# Patient Record
Sex: FEMALE | Race: WHITE | Employment: FULL TIME | URBAN - METROPOLITAN AREA
[De-identification: names, ages, dates, MRNs, and addresses within clinical notes are randomized per-mention and may not be internally consistent; named-entity substitution may affect disease eponyms.]

---

## 2023-08-22 ENCOUNTER — TELEPHONE (OUTPATIENT)
Dept: PRIMARY CARE | Facility: CLINIC | Age: 58
End: 2023-08-22
Payer: COMMERCIAL

## 2023-08-22 DIAGNOSIS — G62.9 NEUROPATHY: Primary | ICD-10-CM

## 2023-08-22 DIAGNOSIS — N39.0 URINARY TRACT INFECTION WITHOUT HEMATURIA, SITE UNSPECIFIED: ICD-10-CM

## 2023-08-22 RX ORDER — GABAPENTIN 100 MG/1
100 CAPSULE ORAL NIGHTLY
Qty: 90 CAPSULE | Refills: 1 | Status: SHIPPED | OUTPATIENT
Start: 2023-08-22 | End: 2024-04-23 | Stop reason: SDUPTHER

## 2023-08-22 RX ORDER — GABAPENTIN 100 MG/1
1 CAPSULE ORAL NIGHTLY
COMMUNITY
Start: 2020-07-17 | End: 2023-08-22 | Stop reason: SDUPTHER

## 2023-08-22 NOTE — TELEPHONE ENCOUNTER
Pt. Asking for antibiotic for uti. States having pressure and urgency. Would like [facility] to [facility].

## 2023-08-23 RX ORDER — CIPROFLOXACIN 500 MG/1
500 TABLET ORAL 2 TIMES DAILY
Qty: 10 TABLET | Refills: 0 | Status: SHIPPED | OUTPATIENT
Start: 2023-08-23 | End: 2023-08-26 | Stop reason: SINTOL

## 2023-08-26 DIAGNOSIS — R39.9 URINARY TRACT INFECTION SYMPTOMS: Primary | ICD-10-CM

## 2023-08-26 DIAGNOSIS — R11.0 NAUSEA: ICD-10-CM

## 2023-08-26 RX ORDER — ONDANSETRON 4 MG/1
4 TABLET, ORALLY DISINTEGRATING ORAL EVERY 8 HOURS PRN
Qty: 15 TABLET | Refills: 0 | Status: SHIPPED | OUTPATIENT
Start: 2023-08-26 | End: 2023-08-31

## 2023-08-26 RX ORDER — NITROFURANTOIN 25; 75 MG/1; MG/1
100 CAPSULE ORAL 2 TIMES DAILY
Qty: 10 CAPSULE | Refills: 0 | Status: SHIPPED | OUTPATIENT
Start: 2023-08-26 | End: 2023-08-31

## 2023-12-21 ENCOUNTER — OFFICE VISIT (OUTPATIENT)
Dept: ORTHOPEDIC SURGERY | Facility: CLINIC | Age: 58
End: 2023-12-21
Payer: COMMERCIAL

## 2023-12-21 DIAGNOSIS — G56.01 CARPAL TUNNEL SYNDROME OF RIGHT WRIST: Primary | ICD-10-CM

## 2023-12-21 PROCEDURE — 1036F TOBACCO NON-USER: CPT | Performed by: ORTHOPAEDIC SURGERY

## 2023-12-21 PROCEDURE — 99213 OFFICE O/P EST LOW 20 MIN: CPT | Performed by: ORTHOPAEDIC SURGERY

## 2023-12-21 NOTE — PROGRESS NOTES
CHIEF COMPLAINT         Right hand inj    ASSESSMENT + PLAN    Recurrent right carpal tunnel syndrome after previous cortisone injection    The nature of carpal tunnel syndrome was reviewed, along with the natural history.  I reviewed the options for management, including observation, nonsteroidals, splinting, oral steroids, cortisone injection, or surgical release, along with the likely success rates of each.  I reviewed the risks of cortisone injection, including infection, hypopigmentation, and fat atrophy.  The transient effect on blood glucose measurement was also reviewed, and the patient wished to proceed.    After sterile prep, I injected 30 mg of Kenalog and 1 cc of 1% lidocaine, plain to the right carpal tunnel.    Patient will followup in 4 weeks if still symptomatic, or with any concerns, and will continue with the brace and nonsteroidals as needed.        HISTORY OF PRESENT ILLNESS       Patient returns today, 3 months after last visit with recurrent numbness and tingling in the median distribution of her right hand.  She is here requesting another cortisone injection.  She does not feel she has time for surgery ahead of the busy holiday season.  No similar symptoms on the left.  This interval is similar to her previous interval between injections.  She is using her night splint.  No interval trauma.      PHYSICAL EXAM       She remains well-developed, well-nourished female in no acute distress.  She appears her stated age and has a pleasant affect.  Skin of the right hand and wrist is intact with no erythema, ecchymosis, or diffuse swelling.  There is a little pink discoloration from the cortisone injection but no hypopigmentation.  Full composite finger flexion extension with good intrinsic plus minus posture.  Intact thenar motor function with 5/5 strength and no wasting.  Sensation intact to light touch in all distributions.  Capillary refill less than 2 seconds.  Positive Phalen and Durkan at the  wrist but negative Tinel at wrist and elbow.  Cervical range of motion does not cause discomfort in the hand.  2+ radial and ulnar pulses.      IMAGING / LABS / EMGs    None today      PROCEDURE    Hand / UE Inj/Asp: R carpal tunnel for carpal tunnel syndrome on 12/22/2023 7:49 AM  Indications: therapeutic and diagnostic  Details: 25 G needle, volar approach  Medications: 30 mg triamcinolone acetonide 40 mg/mL; 0.75 mL lidocaine 10 mg/mL (1 %)  Procedure, treatment alternatives, risks and benefits explained, specific risks discussed. Consent was given by the patient.             Electronically Signed      CINTIA Porter MD      Orthopaedic Hand Surgery      [phone]

## 2023-12-21 NOTE — LETTER
December 22, 2023     Henry Briggs MD  [address]  [address]  [facility] [address]    Patient: Ardiana Trinidad   YOB: 1965   Date of Visit: 12/21/2023       Dear Dr. Henry Briggs MD:    Thank you for referring Adriana Trinidad to me for evaluation. Below are my notes for this consultation.  If you have questions, please do not hesitate to call me. I look forward to following your patient along with you.       Sincerely,     Paulie Porter MD      CC: No Recipients  ______________________________________________________________________________________    CHIEF COMPLAINT         Right hand inj    ASSESSMENT + PLAN    Recurrent right carpal tunnel syndrome after previous cortisone injection    The nature of carpal tunnel syndrome was reviewed, along with the natural history.  I reviewed the options for management, including observation, nonsteroidals, splinting, oral steroids, cortisone injection, or surgical release, along with the likely success rates of each.  I reviewed the risks of cortisone injection, including infection, hypopigmentation, and fat atrophy.  The transient effect on blood glucose measurement was also reviewed, and the patient wished to proceed.    After sterile prep, I injected 30 mg of Kenalog and 1 cc of 1% lidocaine, plain to the right carpal tunnel.    Patient will followup in 4 weeks if still symptomatic, or with any concerns, and will continue with the brace and nonsteroidals as needed.        HISTORY OF PRESENT ILLNESS       Patient returns today, 3 months after last visit with recurrent numbness and tingling in the median distribution of her right hand.  She is here requesting another cortisone injection.  She does not feel she has time for surgery ahead of the busy holiday season.  No similar symptoms on the left.  This interval is similar to her previous interval between injections.  She is using her night splint.  No interval trauma.      PHYSICAL  EXAM       She remains well-developed, well-nourished female in no acute distress.  She appears her stated age and has a pleasant affect.  Skin of the right hand and wrist is intact with no erythema, ecchymosis, or diffuse swelling.  There is a little pink discoloration from the cortisone injection but no hypopigmentation.  Full composite finger flexion extension with good intrinsic plus minus posture.  Intact thenar motor function with 5/5 strength and no wasting.  Sensation intact to light touch in all distributions.  Capillary refill less than 2 seconds.  Positive Phalen and Durkan at the wrist but negative Tinel at wrist and elbow.  Cervical range of motion does not cause discomfort in the hand.  2+ radial and ulnar pulses.      IMAGING / LABS / EMGs    None today      PROCEDURE    Hand / UE Inj/Asp: R carpal tunnel for carpal tunnel syndrome on 12/22/2023 7:49 AM  Indications: therapeutic and diagnostic  Details: 25 G needle, volar approach  Medications: 30 mg triamcinolone acetonide 40 mg/mL; 0.75 mL lidocaine 10 mg/mL (1 %)  Procedure, treatment alternatives, risks and benefits explained, specific risks discussed. Consent was given by the patient.             Electronically Signed      CINTIA Porter MD      Orthopaedic Hand Surgery      [phone]

## 2023-12-22 PROCEDURE — 20526 THER INJECTION CARP TUNNEL: CPT | Performed by: ORTHOPAEDIC SURGERY

## 2023-12-22 RX ORDER — TRIAMCINOLONE ACETONIDE 40 MG/ML
30 INJECTION, SUSPENSION INTRA-ARTICULAR; INTRAMUSCULAR
Status: COMPLETED | OUTPATIENT
Start: 2023-12-22 | End: 2023-12-22

## 2023-12-22 RX ORDER — LIDOCAINE HYDROCHLORIDE 10 MG/ML
0.75 INJECTION INFILTRATION; PERINEURAL
Status: COMPLETED | OUTPATIENT
Start: 2023-12-22 | End: 2023-12-22

## 2023-12-22 RX ADMIN — LIDOCAINE HYDROCHLORIDE 0.75 ML: 10 INJECTION INFILTRATION; PERINEURAL at 07:49

## 2023-12-22 RX ADMIN — TRIAMCINOLONE ACETONIDE 30 MG: 40 INJECTION, SUSPENSION INTRA-ARTICULAR; INTRAMUSCULAR at 07:49

## 2024-02-22 ENCOUNTER — TELEPHONE (OUTPATIENT)
Dept: PRIMARY CARE | Facility: CLINIC | Age: 59
End: 2024-02-22
Payer: COMMERCIAL

## 2024-02-22 NOTE — TELEPHONE ENCOUNTER
Pt. Thinks has uti. States has burning and pressure. [facility] Can you please advise?

## 2024-02-22 NOTE — TELEPHONE ENCOUNTER
Spoke with patient and advised of message   Pt. Will wait until Dr. Briggs's return, does not have time for appt.

## 2024-04-04 ENCOUNTER — OFFICE VISIT (OUTPATIENT)
Dept: ORTHOPEDIC SURGERY | Facility: CLINIC | Age: 59
End: 2024-04-04
Payer: COMMERCIAL

## 2024-04-04 DIAGNOSIS — G56.01 CARPAL TUNNEL SYNDROME OF RIGHT WRIST: Primary | ICD-10-CM

## 2024-04-04 PROCEDURE — 99213 OFFICE O/P EST LOW 20 MIN: CPT | Performed by: ORTHOPAEDIC SURGERY

## 2024-04-04 PROCEDURE — 1036F TOBACCO NON-USER: CPT | Performed by: ORTHOPAEDIC SURGERY

## 2024-04-04 PROCEDURE — 20526 THER INJECTION CARP TUNNEL: CPT | Performed by: ORTHOPAEDIC SURGERY

## 2024-04-04 RX ADMIN — LIDOCAINE HYDROCHLORIDE 0.75 ML: 10 INJECTION INFILTRATION; PERINEURAL at 07:28

## 2024-04-04 RX ADMIN — TRIAMCINOLONE ACETONIDE 30 MG: 40 INJECTION, SUSPENSION INTRA-ARTICULAR; INTRAMUSCULAR at 07:28

## 2024-04-04 NOTE — LETTER
April 5, 2024     Henry Briggs MD  [address]  [address]  [facility] [address]    Patient: Adriana Trinidad   YOB: 1965   Date of Visit: 4/4/2024       Dear Dr. Henry Briggs MD:    Thank you for referring Adriana Trinidad to me for evaluation. Below are my notes for this consultation.  If you have questions, please do not hesitate to call me. I look forward to following your patient along with you.       Sincerely,     Paulie Porter MD      CC: No Recipients  ______________________________________________________________________________________    CHIEF COMPLAINT         Right hand injection    ASSESSMENT + PLAN    Right carpal tunnel syndrome after cortisone injection in the past    The nature of carpal tunnel syndrome was reviewed, along with the natural history.  I reviewed the options for management, including observation, nonsteroidals, splinting, oral steroids, cortisone injection, or surgical release, along with the likely success rates of each.  I reviewed the risks of cortisone injection, including infection, hypopigmentation, and fat atrophy.  The transient effect on blood glucose measurement was also reviewed, and the patient wished to proceed.    I reviewed the expected decreasing duration of symptom relief with repeat cortisone injections to the same part of the body.    After sterile prep, I injected 30 mg of Kenalog and 1 cc of 1% lidocaine, plain to the right carpal tunnel.    Patient will followup in 4 weeks if still symptomatic, or with any concerns, and will continue with the brace and nonsteroidals as needed.  You may simply contact the office if you would like to schedule carpal tunnel surgery.  This would be done under sedation and local at the location of your convenience.        HISTORY OF PRESENT ILLNESS       Patient returns today, about 4 months after last visit with recurrent numbness and tingling in the median nerve distribution of the right hand.   She had her usual excellent response to the previous cortisone injection.  Symptoms have been back for just a week or 2.  No interval trauma.  No numbness or tingling today, just overnight.  No significant symptoms on the left.  She is requesting another injection.      PHYSICAL EXAM       She remains well-developed, well-nourished female in no acute distress.  She appears her stated age and has a pleasant affect.  Skin of the right hand and wrist is intact with no erythema, ecchymosis, or diffuse swelling.  There is some hypopigmentation and a little fat atrophy in the area of the previous injections.  This is nontender.  5/5 APB and hand intrinsics with no wasting.  Positive Phalen and Durkan but negative Tinel at wrist and elbow.  Negative elbow flexion test.  Cervical range of motion is good and does not reproduce chief complaint in the hand.  Sensation intact to light touch in all distributions.  Capillary refill less than 2 seconds.      IMAGING / LABS / EMGs    None today      PROCEDURE    Hand / UE Inj/Asp: R carpal tunnel for carpal tunnel syndrome on 4/4/2024 7:28 AM  Indications: therapeutic and diagnostic  Details: 25 G needle, volar approach  Medications: 30 mg triamcinolone acetonide 40 mg/mL; 0.75 mL lidocaine 10 mg/mL (1 %)  Outcome: tolerated well, no immediate complications  Procedure, treatment alternatives, risks and benefits explained, specific risks discussed. Consent was given by the patient.             Electronically Signed      CINTIA Porter MD      Orthopaedic Hand Surgery      [phone]

## 2024-04-04 NOTE — PROGRESS NOTES
CHIEF COMPLAINT         Right hand injection    ASSESSMENT + PLAN    Right carpal tunnel syndrome after cortisone injection in the past    The nature of carpal tunnel syndrome was reviewed, along with the natural history.  I reviewed the options for management, including observation, nonsteroidals, splinting, oral steroids, cortisone injection, or surgical release, along with the likely success rates of each.  I reviewed the risks of cortisone injection, including infection, hypopigmentation, and fat atrophy.  The transient effect on blood glucose measurement was also reviewed, and the patient wished to proceed.    I reviewed the expected decreasing duration of symptom relief with repeat cortisone injections to the same part of the body.    After sterile prep, I injected 30 mg of Kenalog and 1 cc of 1% lidocaine, plain to the right carpal tunnel.    Patient will followup in 4 weeks if still symptomatic, or with any concerns, and will continue with the brace and nonsteroidals as needed.  You may simply contact the office if you would like to schedule carpal tunnel surgery.  This would be done under sedation and local at the location of your convenience.        HISTORY OF PRESENT ILLNESS       Patient returns today, about 4 months after last visit with recurrent numbness and tingling in the median nerve distribution of the right hand.  She had her usual excellent response to the previous cortisone injection.  Symptoms have been back for just a week or 2.  No interval trauma.  No numbness or tingling today, just overnight.  No significant symptoms on the left.  She is requesting another injection.      PHYSICAL EXAM       She remains well-developed, well-nourished female in no acute distress.  She appears her stated age and has a pleasant affect.  Skin of the right hand and wrist is intact with no erythema, ecchymosis, or diffuse swelling.  There is some hypopigmentation and a little fat atrophy in the area of the  previous injections.  This is nontender.  5/5 APB and hand intrinsics with no wasting.  Positive Phalen and Durkan but negative Tinel at wrist and elbow.  Negative elbow flexion test.  Cervical range of motion is good and does not reproduce chief complaint in the hand.  Sensation intact to light touch in all distributions.  Capillary refill less than 2 seconds.      IMAGING / LABS / EMGs    None today      PROCEDURE    Hand / UE Inj/Asp: R carpal tunnel for carpal tunnel syndrome on 4/4/2024 7:28 AM  Indications: therapeutic and diagnostic  Details: 25 G needle, volar approach  Medications: 30 mg triamcinolone acetonide 40 mg/mL; 0.75 mL lidocaine 10 mg/mL (1 %)  Outcome: tolerated well, no immediate complications  Procedure, treatment alternatives, risks and benefits explained, specific risks discussed. Consent was given by the patient.             Electronically Signed      CINTIA Porter MD      Orthopaedic Hand Surgery      [phone]

## 2024-04-05 RX ORDER — TRIAMCINOLONE ACETONIDE 40 MG/ML
30 INJECTION, SUSPENSION INTRA-ARTICULAR; INTRAMUSCULAR
Status: COMPLETED | OUTPATIENT
Start: 2024-04-04 | End: 2024-04-04

## 2024-04-05 RX ORDER — LIDOCAINE HYDROCHLORIDE 10 MG/ML
0.75 INJECTION INFILTRATION; PERINEURAL
Status: COMPLETED | OUTPATIENT
Start: 2024-04-04 | End: 2024-04-04

## 2024-04-10 DIAGNOSIS — I10 HYPERTENSION, UNSPECIFIED TYPE: Primary | ICD-10-CM

## 2024-04-10 RX ORDER — TRIAMTERENE/HYDROCHLOROTHIAZID 37.5-25 MG
TABLET ORAL
Status: CANCELLED | OUTPATIENT
Start: 2024-04-10

## 2024-04-10 RX ORDER — TRIAMTERENE/HYDROCHLOROTHIAZID 37.5-25 MG
1 TABLET ORAL DAILY
Qty: 30 TABLET | Refills: 0 | Status: SHIPPED | OUTPATIENT
Start: 2024-04-10 | End: 2024-04-23 | Stop reason: SDUPTHER

## 2024-04-10 RX ORDER — TRIAMTERENE/HYDROCHLOROTHIAZID 37.5-25 MG
TABLET ORAL
COMMUNITY
Start: 2020-10-26 | End: 2024-04-10 | Stop reason: SDUPTHER

## 2024-04-23 ENCOUNTER — LAB (OUTPATIENT)
Dept: LAB | Facility: LAB | Age: 59
End: 2024-04-23
Payer: COMMERCIAL

## 2024-04-23 ENCOUNTER — OFFICE VISIT (OUTPATIENT)
Dept: PRIMARY CARE | Facility: CLINIC | Age: 59
End: 2024-04-23
Payer: COMMERCIAL

## 2024-04-23 VITALS
SYSTOLIC BLOOD PRESSURE: 134 MMHG | BODY MASS INDEX: 29.42 KG/M2 | HEART RATE: 89 BPM | OXYGEN SATURATION: 95 % | DIASTOLIC BLOOD PRESSURE: 70 MMHG | WEIGHT: 155.8 LBS | HEIGHT: 61 IN

## 2024-04-23 DIAGNOSIS — Z78.0 ASYMPTOMATIC MENOPAUSE: ICD-10-CM

## 2024-04-23 DIAGNOSIS — I10 HYPERTENSION, UNSPECIFIED TYPE: ICD-10-CM

## 2024-04-23 DIAGNOSIS — Z00.00 ANNUAL PHYSICAL EXAM: Chronic | ICD-10-CM

## 2024-04-23 DIAGNOSIS — Z12.31 VISIT FOR SCREENING MAMMOGRAM: ICD-10-CM

## 2024-04-23 DIAGNOSIS — I10 PRIMARY HYPERTENSION: ICD-10-CM

## 2024-04-23 DIAGNOSIS — G62.9 NEUROPATHY: ICD-10-CM

## 2024-04-23 DIAGNOSIS — M76.61 ACHILLES TENDINITIS OF RIGHT LOWER EXTREMITY: ICD-10-CM

## 2024-04-23 DIAGNOSIS — Z00.00 ANNUAL PHYSICAL EXAM: Primary | Chronic | ICD-10-CM

## 2024-04-23 LAB
ALBUMIN SERPL BCP-MCNC: 4.4 G/DL (ref 3.4–5)
ALP SERPL-CCNC: 80 U/L (ref 33–110)
ALT SERPL W P-5'-P-CCNC: 16 U/L (ref 7–45)
ANION GAP SERPL CALC-SCNC: 16 MMOL/L (ref 10–20)
AST SERPL W P-5'-P-CCNC: 17 U/L (ref 9–39)
BILIRUB SERPL-MCNC: 0.2 MG/DL (ref 0–1.2)
BUN SERPL-MCNC: 30 MG/DL (ref 6–23)
CALCIUM SERPL-MCNC: 9.9 MG/DL (ref 8.6–10.6)
CHLORIDE SERPL-SCNC: 101 MMOL/L (ref 98–107)
CHOLEST SERPL-MCNC: 138 MG/DL (ref 0–199)
CHOLESTEROL/HDL RATIO: 2.8
CO2 SERPL-SCNC: 29 MMOL/L (ref 21–32)
CREAT SERPL-MCNC: 1.11 MG/DL (ref 0.5–1.05)
EGFRCR SERPLBLD CKD-EPI 2021: 57 ML/MIN/1.73M*2
GLUCOSE SERPL-MCNC: 74 MG/DL (ref 74–99)
HDLC SERPL-MCNC: 49.9 MG/DL
LDLC SERPL CALC-MCNC: 51 MG/DL
NON HDL CHOLESTEROL: 88 MG/DL (ref 0–149)
POTASSIUM SERPL-SCNC: 3.3 MMOL/L (ref 3.5–5.3)
PROT SERPL-MCNC: 6.7 G/DL (ref 6.4–8.2)
SODIUM SERPL-SCNC: 143 MMOL/L (ref 136–145)
TRIGL SERPL-MCNC: 188 MG/DL (ref 0–149)
VLDL: 38 MG/DL (ref 0–40)

## 2024-04-23 PROCEDURE — 36415 COLL VENOUS BLD VENIPUNCTURE: CPT

## 2024-04-23 PROCEDURE — 99396 PREV VISIT EST AGE 40-64: CPT | Performed by: INTERNAL MEDICINE

## 2024-04-23 PROCEDURE — 80061 LIPID PANEL: CPT

## 2024-04-23 PROCEDURE — 3078F DIAST BP <80 MM HG: CPT | Performed by: INTERNAL MEDICINE

## 2024-04-23 PROCEDURE — 80053 COMPREHEN METABOLIC PANEL: CPT

## 2024-04-23 PROCEDURE — 3075F SYST BP GE 130 - 139MM HG: CPT | Performed by: INTERNAL MEDICINE

## 2024-04-23 RX ORDER — FLUOXETINE HYDROCHLORIDE 40 MG/1
40 CAPSULE ORAL
COMMUNITY

## 2024-04-23 RX ORDER — GABAPENTIN 100 MG/1
100 CAPSULE ORAL NIGHTLY
Qty: 90 CAPSULE | Refills: 1 | Status: SHIPPED | OUTPATIENT
Start: 2024-04-23 | End: 2024-07-22

## 2024-04-23 RX ORDER — HYDROXYZINE PAMOATE 25 MG/1
25 CAPSULE ORAL AS NEEDED
COMMUNITY
Start: 2024-03-24

## 2024-04-23 RX ORDER — TRIAMTERENE/HYDROCHLOROTHIAZID 37.5-25 MG
1 TABLET ORAL DAILY
Qty: 90 TABLET | Refills: 2 | Status: SHIPPED | OUTPATIENT
Start: 2024-04-23

## 2024-04-23 RX ORDER — DICLOFENAC SODIUM 10 MG/G
4 GEL TOPICAL 4 TIMES DAILY
Qty: 100 G | Refills: 1 | Status: SHIPPED | OUTPATIENT
Start: 2024-04-23

## 2024-04-23 RX ORDER — DEXTROAMPHETAMINE SACCHARATE, AMPHETAMINE ASPARTATE, DEXTROAMPHETAMINE SULFATE AND AMPHETAMINE SULFATE 5; 5; 5; 5 MG/1; MG/1; MG/1; MG/1
20 TABLET ORAL 2 TIMES DAILY
COMMUNITY
Start: 2024-04-05

## 2024-04-23 ASSESSMENT — ENCOUNTER SYMPTOMS
NUMBNESS: 1
CONSTITUTIONAL NEGATIVE: 1

## 2024-04-23 ASSESSMENT — PATIENT HEALTH QUESTIONNAIRE - PHQ9
SUM OF ALL RESPONSES TO PHQ9 QUESTIONS 1 AND 2: 0
2. FEELING DOWN, DEPRESSED OR HOPELESS: NOT AT ALL
10. IF YOU CHECKED OFF ANY PROBLEMS, HOW DIFFICULT HAVE THESE PROBLEMS MADE IT FOR YOU TO DO YOUR WORK, TAKE CARE OF THINGS AT HOME, OR GET ALONG WITH OTHER PEOPLE: SOMEWHAT DIFFICULT
1. LITTLE INTEREST OR PLEASURE IN DOING THINGS: NOT AT ALL
1. LITTLE INTEREST OR PLEASURE IN DOING THINGS: NOT AT ALL
2. FEELING DOWN, DEPRESSED OR HOPELESS: SEVERAL DAYS
SUM OF ALL RESPONSES TO PHQ9 QUESTIONS 1 AND 2: 1

## 2024-04-23 NOTE — PROGRESS NOTES
"Patient ID: Adriana Trinidad is a 58 y.o. female who presents for Annual Exam.    /70   Pulse 89   Ht 1.543 m (5' 0.75\")   Wt 70.7 kg (155 lb 12.8 oz)   SpO2 95%   BMI 29.68 kg/m²     HPI        HTN ON MEDICATIONS CONTROLLED   NO CHEST PAIN , NO SOB , NO PND   NO ORTHOPNEA, NO LEG SWELLING   NO HEADACHE , NO PALPITATION         NON COMPLIANT         GENERALIZED ANXIETY DISORDER   PT NOT TAKING PROZAC       I AM HAVING PAIN OVER THE ACHILLES   TENDONITIS , NO INJURY       I AM HAVING TINGLING   NUMBNESS  RT LEG       PT HAVING RT SCATICA   GABAPENTIN 100MG   1 PILL AT NIGHT           Subjective     Review of Systems   Constitutional: Negative.    Neurological:  Positive for numbness.        RT LEG    All other systems reviewed and are negative.      Objective     Physical Exam  Vitals and nursing note reviewed.   Neck:      Vascular: No carotid bruit.   Cardiovascular:      Rate and Rhythm: Normal rate and regular rhythm.      Pulses: Normal pulses.      Heart sounds: Normal heart sounds.   Pulmonary:      Effort: Pulmonary effort is normal.      Breath sounds: Normal breath sounds.   Abdominal:      General: Abdomen is flat. Bowel sounds are normal.      Palpations: Abdomen is soft.   Musculoskeletal:      Right lower leg: No edema.      Left lower leg: No edema.      Comments: SLIGHT TENDERNESS   OVER THE RT ACHILLES TENDON    Lymphadenopathy:      Cervical: No cervical adenopathy.   Neurological:      General: No focal deficit present.      Mental Status: She is oriented to person, place, and time. Mental status is at baseline.   Psychiatric:         Mood and Affect: Mood normal.         Behavior: Behavior normal.         Thought Content: Thought content normal.         Judgment: Judgment normal.         Lab Results   Component Value Date    WBC 10.2 02/28/2022    HGB 14.2 02/28/2022    HCT 42.8 02/28/2022    MCV 98 02/28/2022     02/28/2022           Problem List Items Addressed This Visit  "      Primary hypertension     Other Visit Diagnoses       Annual physical exam  (Chronic)   -  Primary    Relevant Orders    BI mammo bilateral screening    Comprehensive metabolic panel    Lipid panel    Visit for screening mammogram        Relevant Orders    BI mammo bilateral screening    Asymptomatic menopause        Relevant Orders    XR DEXA bone density    Hypertension, unspecified type        Relevant Medications    triamterene-hydrochlorothiazid (Maxzide-25) 37.5-25 mg tablet    Neuropathy        Relevant Medications    gabapentin (Neurontin) 100 mg capsule    Achilles tendinitis of right lower extremity        Relevant Medications    diclofenac sodium (Voltaren) 1 % gel              A/P         VOLTAREN GEL 1% APPLY TOPICALLY TO THE AFFECTED AREA   ON THE HEEL   CMP,LIPID  DEXA SCAN   MAMMOGRAM   TRIAMTERENE-hydrochlorothiazide  37.5-25  1 PILL EVERY DAY FILLED   VOLTAREN GEL 1% APPLY TOPICALLY TO THE AFFECTED HEEL NEEDED FEW TIMES A DAY   GABAPENTIN 100MG 1 PILL AT NIGHT   FOLLOW UP IN 6 MONTHS TIME

## 2024-04-24 DIAGNOSIS — E87.6 HYPOKALEMIA: Primary | ICD-10-CM

## 2024-04-24 RX ORDER — POTASSIUM CHLORIDE 20 MEQ/1
20 TABLET, EXTENDED RELEASE ORAL DAILY
Qty: 15 TABLET | Refills: 1 | Status: SHIPPED | OUTPATIENT
Start: 2024-04-24 | End: 2024-05-04

## 2024-05-01 DIAGNOSIS — E87.6 HYPOKALEMIA: ICD-10-CM

## 2024-05-04 RX ORDER — POTASSIUM CHLORIDE 20 MEQ/1
20 TABLET, EXTENDED RELEASE ORAL DAILY
Qty: 30 TABLET | Refills: 2 | Status: SHIPPED | OUTPATIENT
Start: 2024-05-04

## 2024-05-06 ENCOUNTER — TELEPHONE (OUTPATIENT)
Dept: PRIMARY CARE | Facility: CLINIC | Age: 59
End: 2024-05-06
Payer: COMMERCIAL

## 2024-05-06 DIAGNOSIS — E87.6 HYPOKALEMIA: Primary | ICD-10-CM

## 2024-05-06 RX ORDER — POTASSIUM CHLORIDE 20 MEQ/15ML
20 SOLUTION ORAL DAILY
Qty: 473 ML | Refills: 1 | Status: SHIPPED | OUTPATIENT
Start: 2024-05-06 | End: 2024-06-11

## 2024-05-06 NOTE — TELEPHONE ENCOUNTER
Pt. States has 2 problems. 1.) states the potassium pill is too big for her to swallow. And 2.) States she jammed her pinky finger on her right hand and now it won't bend. She is asking if she should tape it up, see you, or see her hand specialist. Please advise.

## 2024-05-28 ENCOUNTER — HOSPITAL ENCOUNTER (OUTPATIENT)
Dept: RADIOLOGY | Facility: CLINIC | Age: 59
Discharge: HOME | End: 2024-05-28
Payer: COMMERCIAL

## 2024-05-28 ENCOUNTER — OFFICE VISIT (OUTPATIENT)
Dept: ORTHOPEDIC SURGERY | Facility: CLINIC | Age: 59
End: 2024-05-28
Payer: COMMERCIAL

## 2024-05-28 DIAGNOSIS — S63.639A JERSEY FINGER, INITIAL ENCOUNTER: ICD-10-CM

## 2024-05-28 DIAGNOSIS — M79.644 PAIN OF FINGER OF RIGHT HAND: ICD-10-CM

## 2024-05-28 PROCEDURE — 73140 X-RAY EXAM OF FINGER(S): CPT | Mod: RIGHT SIDE | Performed by: RADIOLOGY

## 2024-05-28 PROCEDURE — 99214 OFFICE O/P EST MOD 30 MIN: CPT | Performed by: ORTHOPAEDIC SURGERY

## 2024-05-28 PROCEDURE — 73140 X-RAY EXAM OF FINGER(S): CPT | Mod: RT

## 2024-05-28 NOTE — PROGRESS NOTES
CHIEF COMPLAINT         Right small finger stiffness and pain    ASSESSMENT + PLAN    Right small finger jersey injury, subacute    I reviewed the nature of this probable flexor tendon avulsion and the natural history.  I do not expect that motion will improve without surgical reattachment of the tendon.  Given the 5 weeks since injury, I reviewed the possibility that the flexor sheath will have collapsed and the tendon cannot be passed back through.  In that case, I would place a tendon lisa at the first surgery in order to generate a new tendon sheath.  A second surgery, likely with a tendon graft, would be necessary at least 3 or 4 months down the road.  I reviewed the major risks and benefits of each of these.  Surgery will be done under general anesthetic.  I reviewed the need for postoperative immobilization and the case of reattachment, but not lisa placement.    I reviewed that the outcome of this will be better if it can be addressed before you start your new job and leave town on a cruise.    Contact my office to set up an exact surgical date.  Continue with unrestricted activity in the meantime.        HISTORY OF PRESENT ILLNESS       Patient is a 58 y.o. right-hand dominant female, known to me from previous treatment for right carpal tunnel syndrome, who presents today for evaluation of a new complaint.  4 or 5 weeks ago she went to block something falling toward her head, and jammed the tip of the right small finger.  Since that time she has been unable to flex the distal joint and has significant stiffness at the proximal joint.  No prodromal pain or stiffness.  No numbness or tingling.  No similar problems in other digits.      PHYSICAL EXAM    Constitutional:    Appears stated age. Well-developed and well-nourished female in no acute distress.  Psychiatric:         Pleasant normal mood and affect. Behavior is appropriate for the situation.   Head:                   Normocephalic and atraumatic.  Eyes:                     Pupils are equal and round.  Cardiovascular:  2+ radial and ulnar pulses. Fingers well-perfused.  Respiratory:        Effort normal. No respiratory distress. Speaking in complete sentences.  Neurologic:       Alert and oriented to person, place, and time.  Skin:                Skin is intact, warm and dry.  Hematologic / Lymphatic:    No lymphedema or lymphangitis.    Extremities / Musculoskeletal:                      Skin of right small finger and hand is intact with no erythema, ecchymosis, or diffuse swelling.  Normal skin drag and coloration.  Small finger sits in full composite extension.  There is active 80 degree MP flexion but just a 20 degree arc at the PIP and no active DIP flexion.  No palpable tender mass to indicate a tendon end.  FDS of the small appears to be absent bilaterally, a common anatomic variant.  Good wrist and forearm motion.  Sensation intact to light touch in all distributions.  Capillary refill less than 2 seconds.      IMAGING / LABS / EMGs           X-rays right small finger ordered and independently interpreted by me today show no acute fracture, subluxation, or foreign body.  Joint spaces are concentric and well-preserved.  No bony marika.  Looking carefully on the lateral, there is fat density where I would expect to see the terminal flexor tendon overlying the DIP joint and proximal section of the distal phalanx.      Past Medical History:   Diagnosis Date    Carpal tunnel syndrome, right upper limb 01/29/2019    Carpal tunnel syndrome of right wrist    Encounter for screening for diabetes mellitus 03/20/2017    Screening for diabetes mellitus (DM)    Encounter for screening for diseases of the blood and blood-forming organs and certain disorders involving the immune mechanism 01/28/2020    Screening, anemia, deficiency, iron    Encounter for screening for malignant neoplasm of colon 09/29/2016    Colon cancer screening    Encounter for screening for other  suspected endocrine disorder 02/28/2022    Screening for thyroid disorder    Generalized anxiety disorder 09/28/2020    Generalized anxiety disorder    Other specified postprocedural states 03/11/2022    History of Papanicolaou smear    Other specified postprocedural states 01/28/2020    History of Papanicolaou smear    Other specified postprocedural states 07/17/2020    History of Papanicolaou smear    Personal history of other diseases of the circulatory system     History of hypertension    Personal history of other malignant neoplasm of skin     History of malignant neoplasm of skin    Psychophysiologic insomnia 04/11/2018    Chronic insomnia    Unilateral primary osteoarthritis, right knee     Osteoarthritis of right knee    Vitamin D deficiency, unspecified 09/30/2016    Vitamin D deficiency       Medication Documentation Review Audit       Reviewed by Henry Briggs MD (Physician) on 04/23/24 at 1444      Medication Order Taking? Sig Documenting Provider Last Dose Status   amphetamine-dextroamphetamine (Adderall) 20 mg tablet [id number] Yes Take 1 tablet (20 mg) by mouth 2 times a day. Historical Provider, MD  Active   FLUoxetine (PROzac) 40 mg capsule [id number]  Take 1 capsule (40 mg) by mouth once daily in the morning. Take before meals. Historical Provider, MD  Active   gabapentin (Neurontin) 100 mg capsule [id number]  Take 1 capsule (100 mg) by mouth once daily at bedtime. Henry Briggs MD  Active   hydrOXYzine pamoate (Vistaril) 25 mg capsule [id number] Yes Take 1 capsule (25 mg) by mouth if needed. Historical Provider, MD  Active   triamterene-hydrochlorothiazid (Maxzide-25) 37.5-25 mg tablet [id number] Yes Take 1 tablet by mouth once daily. Henry Briggs MD Taking Active                    Allergies   Allergen Reactions    Bupropion Headache       Social History     Socioeconomic History    Marital status: Single     Spouse name: Not on file    Number of children: Not on file    Years of  education: Not on file    Highest education level: Not on file   Occupational History    Not on file   Tobacco Use    Smoking status: Every Day     Types: Cigarettes    Smokeless tobacco: Never   Substance and Sexual Activity    Alcohol use: Not on file    Drug use: Not on file    Sexual activity: Not on file   Other Topics Concern    Not on file   Social History Narrative    Not on file     Social Determinants of Health     Financial Resource Strain: Not on file   Food Insecurity: Not on file   Transportation Needs: Not on file   Physical Activity: Not on file   Stress: Not on file   Social Connections: Not on file   Intimate Partner Violence: Not on file   Housing Stability: Not on file       Past Surgical History:   Procedure Laterality Date    NECK SURGERY  08/08/2016    Neck Surgery     SURGICAL INDICATION    I reviewed the options for further management of this condition and the likely success rates of each.  The patient feels that they have maximized the benefits of conservative care, and they do want to go on to surgery.    I reviewed the major risks of surgery including infection; scarring; damage to nerves, tendons, or vessels; stiffness; irreparable tendon, need for lisa placement, late rerupture, and wound healing problems, as well as anesthesia risks.  I answered their questions to their satisfaction.  They were given my contact information and will contact the office when they are ready to schedule an exact surgical date.  Surgery will be posted as follows :    Dx :          Right small finger jersey injury  ICD-10 :      S63.639A  Procedure :     Right small finger flexor tendon repair ; possible tendon lisa placement  CPT :        00454  Anesth :    General  Location :   [facility]  Duration :    90 minutes  Specials :    Lead hand, red rubber catheter, Arthrex Inés corkscrew anchor  PAT :       No  Post-Op Visit :    10-15 days      Electronically Signed      CINTIA Porter MD       Orthopaedic Hand Surgery      [phone]

## 2024-05-28 NOTE — LETTER
Juliann 15, 2024     Henry Briggs MD  [address]  [address]  [facility] [address]    Patient: Adriana Trinidad   YOB: 1965   Date of Visit: 5/28/2024       Dear Dr. Henry Briggs MD:    Thank you for referring Adriana Trinidad to me for evaluation. Below are my notes for this consultation.  If you have questions, please do not hesitate to call me. I look forward to following your patient along with you.       Sincerely,     Paulie Porter MD      CC: No Recipients  ______________________________________________________________________________________    CHIEF COMPLAINT         Right small finger stiffness and pain    ASSESSMENT + PLAN    Right small finger jersey injury, subacute    I reviewed the nature of this probable flexor tendon avulsion and the natural history.  I do not expect that motion will improve without surgical reattachment of the tendon.  Given the 5 weeks since injury, I reviewed the possibility that the flexor sheath will have collapsed and the tendon cannot be passed back through.  In that case, I would place a tendon lisa at the first surgery in order to generate a new tendon sheath.  A second surgery, likely with a tendon graft, would be necessary at least 3 or 4 months down the road.  I reviewed the major risks and benefits of each of these.  Surgery will be done under general anesthetic.  I reviewed the need for postoperative immobilization and the case of reattachment, but not lisa placement.    I reviewed that the outcome of this will be better if it can be addressed before you start your new job and leave town on a cruise.    Contact my office to set up an exact surgical date.  Continue with unrestricted activity in the meantime.        HISTORY OF PRESENT ILLNESS       Patient is a 58 y.o. right-hand dominant female, known to me from previous treatment for right carpal tunnel syndrome, who presents today for evaluation of a new complaint.  4 or 5 weeks ago she  went to block something falling toward her head, and jammed the tip of the right small finger.  Since that time she has been unable to flex the distal joint and has significant stiffness at the proximal joint.  No prodromal pain or stiffness.  No numbness or tingling.  No similar problems in other digits.      PHYSICAL EXAM    Constitutional:    Appears stated age. Well-developed and well-nourished female in no acute distress.  Psychiatric:         Pleasant normal mood and affect. Behavior is appropriate for the situation.   Head:                   Normocephalic and atraumatic.  Eyes:                    Pupils are equal and round.  Cardiovascular:  2+ radial and ulnar pulses. Fingers well-perfused.  Respiratory:        Effort normal. No respiratory distress. Speaking in complete sentences.  Neurologic:       Alert and oriented to person, place, and time.  Skin:                Skin is intact, warm and dry.  Hematologic / Lymphatic:    No lymphedema or lymphangitis.    Extremities / Musculoskeletal:                      Skin of right small finger and hand is intact with no erythema, ecchymosis, or diffuse swelling.  Normal skin drag and coloration.  Small finger sits in full composite extension.  There is active 80 degree MP flexion but just a 20 degree arc at the PIP and no active DIP flexion.  No palpable tender mass to indicate a tendon end.  FDS of the small appears to be absent bilaterally, a common anatomic variant.  Good wrist and forearm motion.  Sensation intact to light touch in all distributions.  Capillary refill less than 2 seconds.      IMAGING / LABS / EMGs           X-rays right small finger ordered and independently interpreted by me today show no acute fracture, subluxation, or foreign body.  Joint spaces are concentric and well-preserved.  No bony marika.  Looking carefully on the lateral, there is fat density where I would expect to see the terminal flexor tendon overlying the DIP joint and proximal  section of the distal phalanx.      Past Medical History:   Diagnosis Date   • Carpal tunnel syndrome, right upper limb 01/29/2019    Carpal tunnel syndrome of right wrist   • Encounter for screening for diabetes mellitus 03/20/2017    Screening for diabetes mellitus (DM)   • Encounter for screening for diseases of the blood and blood-forming organs and certain disorders involving the immune mechanism 01/28/2020    Screening, anemia, deficiency, iron   • Encounter for screening for malignant neoplasm of colon 09/29/2016    Colon cancer screening   • Encounter for screening for other suspected endocrine disorder 02/28/2022    Screening for thyroid disorder   • Generalized anxiety disorder 09/28/2020    Generalized anxiety disorder   • Other specified postprocedural states 03/11/2022    History of Papanicolaou smear   • Other specified postprocedural states 01/28/2020    History of Papanicolaou smear   • Other specified postprocedural states 07/17/2020    History of Papanicolaou smear   • Personal history of other diseases of the circulatory system     History of hypertension   • Personal history of other malignant neoplasm of skin     History of malignant neoplasm of skin   • Psychophysiologic insomnia 04/11/2018    Chronic insomnia   • Unilateral primary osteoarthritis, right knee     Osteoarthritis of right knee   • Vitamin D deficiency, unspecified 09/30/2016    Vitamin D deficiency       Medication Documentation Review Audit       Reviewed by Henry Briggs MD (Physician) on 04/23/24 at 1444      Medication Order Taking? Sig Documenting Provider Last Dose Status   amphetamine-dextroamphetamine (Adderall) 20 mg tablet [id number] Yes Take 1 tablet (20 mg) by mouth 2 times a day. Historical Provider, MD  Active   FLUoxetine (PROzac) 40 mg capsule [id number]  Take 1 capsule (40 mg) by mouth once daily in the morning. Take before meals. Historical Provider, MD  Active   gabapentin (Neurontin) 100 mg capsule [id number]   Take 1 capsule (100 mg) by mouth once daily at bedtime. Henry Briggs MD  Active   hydrOXYzine pamoate (Vistaril) 25 mg capsule [id number] Yes Take 1 capsule (25 mg) by mouth if needed. Historical Provider, MD  Active   triamterene-hydrochlorothiazid (Maxzide-25) 37.5-25 mg tablet [id number] Yes Take 1 tablet by mouth once daily. Henry Briggs MD Taking Active                    Allergies   Allergen Reactions   • Bupropion Headache       Social History     Socioeconomic History   • Marital status: Single     Spouse name: Not on file   • Number of children: Not on file   • Years of education: Not on file   • Highest education level: Not on file   Occupational History   • Not on file   Tobacco Use   • Smoking status: Every Day     Types: Cigarettes   • Smokeless tobacco: Never   Substance and Sexual Activity   • Alcohol use: Not on file   • Drug use: Not on file   • Sexual activity: Not on file   Other Topics Concern   • Not on file   Social History Narrative   • Not on file     Social Determinants of Health     Financial Resource Strain: Not on file   Food Insecurity: Not on file   Transportation Needs: Not on file   Physical Activity: Not on file   Stress: Not on file   Social Connections: Not on file   Intimate Partner Violence: Not on file   Housing Stability: Not on file       Past Surgical History:   Procedure Laterality Date   • NECK SURGERY  08/08/2016    Neck Surgery     SURGICAL INDICATION    I reviewed the options for further management of this condition and the likely success rates of each.  The patient feels that they have maximized the benefits of conservative care, and they do want to go on to surgery.    I reviewed the major risks of surgery including infection; scarring; damage to nerves, tendons, or vessels; stiffness; irreparable tendon, need for lisa placement, late rerupture, and wound healing problems, as well as anesthesia risks.  I answered their questions to their satisfaction.  They  were given my contact information and will contact the office when they are ready to schedule an exact surgical date.  Surgery will be posted as follows :    Dx :          Right small finger jersey injury  ICD-10 :      S63.639A  Procedure :     Right small finger flexor tendon repair ; possible tendon lisa placement  CPT :        37974  Anesth :    General  [facility] :   [facility]  Duration :    90 minutes  Specials :    Lead hand, red rubber catheter, Arthrex Inés corkscrew anchor  PAT :       No  Post-Op Visit :    10-15 days      Electronically Signed      CINTIA Porter MD      Orthopaedic Hand Surgery      [phone]

## 2024-05-28 NOTE — H&P (VIEW-ONLY)
CHIEF COMPLAINT         Right small finger stiffness and pain    ASSESSMENT + PLAN    Right small finger jersey injury, subacute    I reviewed the nature of this probable flexor tendon avulsion and the natural history.  I do not expect that motion will improve without surgical reattachment of the tendon.  Given the 5 weeks since injury, I reviewed the possibility that the flexor sheath will have collapsed and the tendon cannot be passed back through.  In that case, I would place a tendon lisa at the first surgery in order to generate a new tendon sheath.  A second surgery, likely with a tendon graft, would be necessary at least 3 or 4 months down the road.  I reviewed the major risks and benefits of each of these.  Surgery will be done under general anesthetic.  I reviewed the need for postoperative immobilization and the case of reattachment, but not lisa placement.    I reviewed that the outcome of this will be better if it can be addressed before you start your new job and leave town on a cruise.    Contact my office to set up an exact surgical date.  Continue with unrestricted activity in the meantime.        HISTORY OF PRESENT ILLNESS       Patient is a 58 y.o. right-hand dominant female, known to me from previous treatment for right carpal tunnel syndrome, who presents today for evaluation of a new complaint.  4 or 5 weeks ago she went to block something falling toward her head, and jammed the tip of the right small finger.  Since that time she has been unable to flex the distal joint and has significant stiffness at the proximal joint.  No prodromal pain or stiffness.  No numbness or tingling.  No similar problems in other digits.      PHYSICAL EXAM    Constitutional:    Appears stated age. Well-developed and well-nourished female in no acute distress.  Psychiatric:         Pleasant normal mood and affect. Behavior is appropriate for the situation.   Head:                   Normocephalic and atraumatic.  Eyes:                     Pupils are equal and round.  Cardiovascular:  2+ radial and ulnar pulses. Fingers well-perfused.  Respiratory:        Effort normal. No respiratory distress. Speaking in complete sentences.  Neurologic:       Alert and oriented to person, place, and time.  Skin:                Skin is intact, warm and dry.  Hematologic / Lymphatic:    No lymphedema or lymphangitis.    Extremities / Musculoskeletal:                      Skin of right small finger and hand is intact with no erythema, ecchymosis, or diffuse swelling.  Normal skin drag and coloration.  Small finger sits in full composite extension.  There is active 80 degree MP flexion but just a 20 degree arc at the PIP and no active DIP flexion.  No palpable tender mass to indicate a tendon end.  FDS of the small appears to be absent bilaterally, a common anatomic variant.  Good wrist and forearm motion.  Sensation intact to light touch in all distributions.  Capillary refill less than 2 seconds.      IMAGING / LABS / EMGs           X-rays right small finger ordered and independently interpreted by me today show no acute fracture, subluxation, or foreign body.  Joint spaces are concentric and well-preserved.  No bony marika.  Looking carefully on the lateral, there is fat density where I would expect to see the terminal flexor tendon overlying the DIP joint and proximal section of the distal phalanx.      Past Medical History:   Diagnosis Date    Carpal tunnel syndrome, right upper limb 01/29/2019    Carpal tunnel syndrome of right wrist    Encounter for screening for diabetes mellitus 03/20/2017    Screening for diabetes mellitus (DM)    Encounter for screening for diseases of the blood and blood-forming organs and certain disorders involving the immune mechanism 01/28/2020    Screening, anemia, deficiency, iron    Encounter for screening for malignant neoplasm of colon 09/29/2016    Colon cancer screening    Encounter for screening for other  suspected endocrine disorder 02/28/2022    Screening for thyroid disorder    Generalized anxiety disorder 09/28/2020    Generalized anxiety disorder    Other specified postprocedural states 03/11/2022    History of Papanicolaou smear    Other specified postprocedural states 01/28/2020    History of Papanicolaou smear    Other specified postprocedural states 07/17/2020    History of Papanicolaou smear    Personal history of other diseases of the circulatory system     History of hypertension    Personal history of other malignant neoplasm of skin     History of malignant neoplasm of skin    Psychophysiologic insomnia 04/11/2018    Chronic insomnia    Unilateral primary osteoarthritis, right knee     Osteoarthritis of right knee    Vitamin D deficiency, unspecified 09/30/2016    Vitamin D deficiency       Medication Documentation Review Audit       Reviewed by Henry Briggs MD (Physician) on 04/23/24 at 1444      Medication Order Taking? Sig Documenting Provider Last Dose Status   amphetamine-dextroamphetamine (Adderall) 20 mg tablet [id number] Yes Take 1 tablet (20 mg) by mouth 2 times a day. Historical Provider, MD  Active   FLUoxetine (PROzac) 40 mg capsule [id number]  Take 1 capsule (40 mg) by mouth once daily in the morning. Take before meals. Historical Provider, MD  Active   gabapentin (Neurontin) 100 mg capsule [id number]  Take 1 capsule (100 mg) by mouth once daily at bedtime. Henry Briggs MD  Active   hydrOXYzine pamoate (Vistaril) 25 mg capsule [id number] Yes Take 1 capsule (25 mg) by mouth if needed. Historical Provider, MD  Active   triamterene-hydrochlorothiazid (Maxzide-25) 37.5-25 mg tablet [id number] Yes Take 1 tablet by mouth once daily. Henry Briggs MD Taking Active                    Allergies   Allergen Reactions    Bupropion Headache       Social History     Socioeconomic History    Marital status: Single     Spouse name: Not on file    Number of children: Not on file    Years of  education: Not on file    Highest education level: Not on file   Occupational History    Not on file   Tobacco Use    Smoking status: Every Day     Types: Cigarettes    Smokeless tobacco: Never   Substance and Sexual Activity    Alcohol use: Not on file    Drug use: Not on file    Sexual activity: Not on file   Other Topics Concern    Not on file   Social History Narrative    Not on file     Social Determinants of Health     Financial Resource Strain: Not on file   Food Insecurity: Not on file   Transportation Needs: Not on file   Physical Activity: Not on file   Stress: Not on file   Social Connections: Not on file   Intimate Partner Violence: Not on file   Housing Stability: Not on file       Past Surgical History:   Procedure Laterality Date    NECK SURGERY  08/08/2016    Neck Surgery     SURGICAL INDICATION    I reviewed the options for further management of this condition and the likely success rates of each.  The patient feels that they have maximized the benefits of conservative care, and they do want to go on to surgery.    I reviewed the major risks of surgery including infection; scarring; damage to nerves, tendons, or vessels; stiffness; irreparable tendon, need for lisa placement, late rerupture, and wound healing problems, as well as anesthesia risks.  I answered their questions to their satisfaction.  They were given my contact information and will contact the office when they are ready to schedule an exact surgical date.  Surgery will be posted as follows :    Dx :          Right small finger jersey injury  ICD-10 :      S63.639A  Procedure :     Right small finger flexor tendon repair ; possible tendon lisa placement  CPT :        86984  Anesth :    General  Location :   [facility]  Duration :    90 minutes  Specials :    Lead hand, red rubber catheter, Ar[employer]rex In[employer] corkscrew anchor  PAT :       No  Post-Op Visit :    10-15 days      Electronically Signed      CINTIA Porter MD       Orthopaedic Hand Surgery      [phone]

## 2024-06-03 DIAGNOSIS — G56.01 CARPAL TUNNEL SYNDROME ON RIGHT: ICD-10-CM

## 2024-06-03 DIAGNOSIS — S66.195A OTHER INJURY OF FLEXOR MUSCLE, FASCIA AND TENDON OF LEFT RING FINGER AT WRIST AND HAND LEVEL, INITIAL ENCOUNTER: ICD-10-CM

## 2024-06-08 DIAGNOSIS — E87.6 HYPOKALEMIA: ICD-10-CM

## 2024-06-11 RX ORDER — POTASSIUM CHLORIDE 20 MEQ/15ML
20 SOLUTION ORAL DAILY
Qty: 1350 ML | Refills: 1 | Status: SHIPPED | OUTPATIENT
Start: 2024-06-11

## 2024-06-15 PROBLEM — S63.639A JERSEY FINGER, INITIAL ENCOUNTER: Status: ACTIVE | Noted: 2024-06-15

## 2024-06-15 RX ORDER — CEFAZOLIN SODIUM 2 G/100ML
2 INJECTION, SOLUTION INTRAVENOUS ONCE
Status: CANCELLED | OUTPATIENT
Start: 2024-06-15 | End: 2024-06-15

## 2024-06-17 ENCOUNTER — ANESTHESIA EVENT (OUTPATIENT)
Dept: OPERATING ROOM | Facility: CLINIC | Age: 59
End: 2024-06-17
Payer: COMMERCIAL

## 2024-06-17 RX ORDER — LIDOCAINE IN NACL,ISO-OSMOT/PF 30 MG/3 ML
0.1 SYRINGE (ML) INJECTION ONCE
Status: CANCELLED | OUTPATIENT
Start: 2024-06-17 | End: 2024-06-17

## 2024-06-18 ENCOUNTER — ANESTHESIA (OUTPATIENT)
Dept: OPERATING ROOM | Facility: CLINIC | Age: 59
End: 2024-06-18
Payer: COMMERCIAL

## 2024-06-18 ENCOUNTER — HOSPITAL ENCOUNTER (OUTPATIENT)
Facility: CLINIC | Age: 59
Setting detail: OUTPATIENT SURGERY
Discharge: HOME | End: 2024-06-18
Attending: ORTHOPAEDIC SURGERY | Admitting: ORTHOPAEDIC SURGERY
Payer: COMMERCIAL

## 2024-06-18 VITALS
HEART RATE: 84 BPM | BODY MASS INDEX: 29.26 KG/M2 | WEIGHT: 154.98 LBS | DIASTOLIC BLOOD PRESSURE: 70 MMHG | TEMPERATURE: 97.2 F | RESPIRATION RATE: 16 BRPM | OXYGEN SATURATION: 98 % | SYSTOLIC BLOOD PRESSURE: 150 MMHG | HEIGHT: 61 IN

## 2024-06-18 DIAGNOSIS — S63.639A JERSEY FINGER, INITIAL ENCOUNTER: Primary | ICD-10-CM

## 2024-06-18 DIAGNOSIS — S66.195A OTHER INJURY OF FLEXOR MUSCLE, FASCIA AND TENDON OF LEFT RING FINGER AT WRIST AND HAND LEVEL, INITIAL ENCOUNTER: ICD-10-CM

## 2024-06-18 DIAGNOSIS — G56.01 CARPAL TUNNEL SYNDROME ON RIGHT: ICD-10-CM

## 2024-06-18 PROCEDURE — A64721 PR REVISE MEDIAN N/CARPAL TUNNEL SURG: Performed by: NURSE ANESTHETIST, CERTIFIED REGISTERED

## 2024-06-18 PROCEDURE — A4217 STERILE WATER/SALINE, 500 ML: HCPCS | Performed by: ORTHOPAEDIC SURGERY

## 2024-06-18 PROCEDURE — 3700000001 HC GENERAL ANESTHESIA TIME - INITIAL BASE CHARGE: Performed by: ORTHOPAEDIC SURGERY

## 2024-06-18 PROCEDURE — 26145 TENDON EXCISION PALM/FINGER: CPT | Performed by: ORTHOPAEDIC SURGERY

## 2024-06-18 PROCEDURE — 3600000008 HC OR TIME - EACH INCREMENTAL 1 MINUTE - PROCEDURE LEVEL THREE: Performed by: ORTHOPAEDIC SURGERY

## 2024-06-18 PROCEDURE — 2500000004 HC RX 250 GENERAL PHARMACY W/ HCPCS (ALT 636 FOR OP/ED): Performed by: ANESTHESIOLOGY

## 2024-06-18 PROCEDURE — 7100000009 HC PHASE TWO TIME - INITIAL BASE CHARGE: Performed by: ORTHOPAEDIC SURGERY

## 2024-06-18 PROCEDURE — 7100000010 HC PHASE TWO TIME - EACH INCREMENTAL 1 MINUTE: Performed by: ORTHOPAEDIC SURGERY

## 2024-06-18 PROCEDURE — 3600000003 HC OR TIME - INITIAL BASE CHARGE - PROCEDURE LEVEL THREE: Performed by: ORTHOPAEDIC SURGERY

## 2024-06-18 PROCEDURE — 2500000004 HC RX 250 GENERAL PHARMACY W/ HCPCS (ALT 636 FOR OP/ED): Mod: JZ,JG | Performed by: ORTHOPAEDIC SURGERY

## 2024-06-18 PROCEDURE — 2500000004 HC RX 250 GENERAL PHARMACY W/ HCPCS (ALT 636 FOR OP/ED): Performed by: NURSE ANESTHETIST, CERTIFIED REGISTERED

## 2024-06-18 PROCEDURE — 26485 TRANSPLANT PALM TENDON: CPT | Performed by: ORTHOPAEDIC SURGERY

## 2024-06-18 PROCEDURE — A64721 PR REVISE MEDIAN N/CARPAL TUNNEL SURG: Performed by: STUDENT IN AN ORGANIZED HEALTH CARE EDUCATION/TRAINING PROGRAM

## 2024-06-18 PROCEDURE — 64721 CARPAL TUNNEL SURGERY: CPT | Performed by: ORTHOPAEDIC SURGERY

## 2024-06-18 PROCEDURE — 3700000002 HC GENERAL ANESTHESIA TIME - EACH INCREMENTAL 1 MINUTE: Performed by: ORTHOPAEDIC SURGERY

## 2024-06-18 PROCEDURE — 2500000005 HC RX 250 GENERAL PHARMACY W/O HCPCS: Performed by: ORTHOPAEDIC SURGERY

## 2024-06-18 RX ORDER — OXYCODONE HYDROCHLORIDE 5 MG/1
5 TABLET ORAL EVERY 4 HOURS PRN
Status: DISCONTINUED | OUTPATIENT
Start: 2024-06-18 | End: 2024-06-18 | Stop reason: HOSPADM

## 2024-06-18 RX ORDER — ACETAMINOPHEN 325 MG/1
650 TABLET ORAL EVERY 4 HOURS PRN
Status: DISCONTINUED | OUTPATIENT
Start: 2024-06-18 | End: 2024-06-18 | Stop reason: HOSPADM

## 2024-06-18 RX ORDER — LABETALOL HYDROCHLORIDE 5 MG/ML
5 INJECTION, SOLUTION INTRAVENOUS ONCE AS NEEDED
Status: DISCONTINUED | OUTPATIENT
Start: 2024-06-18 | End: 2024-06-18 | Stop reason: HOSPADM

## 2024-06-18 RX ORDER — SODIUM CHLORIDE, SODIUM LACTATE, POTASSIUM CHLORIDE, CALCIUM CHLORIDE 600; 310; 30; 20 MG/100ML; MG/100ML; MG/100ML; MG/100ML
INJECTION, SOLUTION INTRAVENOUS CONTINUOUS PRN
Status: DISCONTINUED | OUTPATIENT
Start: 2024-06-18 | End: 2024-06-18

## 2024-06-18 RX ORDER — BUPIVACAINE HYDROCHLORIDE 5 MG/ML
INJECTION, SOLUTION EPIDURAL; INTRACAUDAL AS NEEDED
Status: DISCONTINUED | OUTPATIENT
Start: 2024-06-18 | End: 2024-06-18 | Stop reason: HOSPADM

## 2024-06-18 RX ORDER — HYDROCODONE BITARTRATE AND ACETAMINOPHEN 5; 325 MG/1; MG/1
1 TABLET ORAL EVERY 6 HOURS PRN
Qty: 20 TABLET | Refills: 0 | Status: SHIPPED | OUTPATIENT
Start: 2024-06-18

## 2024-06-18 RX ORDER — MIDAZOLAM HYDROCHLORIDE 1 MG/ML
INJECTION, SOLUTION INTRAMUSCULAR; INTRAVENOUS AS NEEDED
Status: DISCONTINUED | OUTPATIENT
Start: 2024-06-18 | End: 2024-06-18

## 2024-06-18 RX ORDER — CEFAZOLIN 1 G/1
INJECTION, POWDER, FOR SOLUTION INTRAVENOUS AS NEEDED
Status: DISCONTINUED | OUTPATIENT
Start: 2024-06-18 | End: 2024-06-18

## 2024-06-18 RX ORDER — ONDANSETRON HYDROCHLORIDE 2 MG/ML
4 INJECTION, SOLUTION INTRAVENOUS ONCE AS NEEDED
Status: DISCONTINUED | OUTPATIENT
Start: 2024-06-18 | End: 2024-06-18 | Stop reason: HOSPADM

## 2024-06-18 RX ORDER — ONDANSETRON HYDROCHLORIDE 2 MG/ML
INJECTION, SOLUTION INTRAVENOUS AS NEEDED
Status: DISCONTINUED | OUTPATIENT
Start: 2024-06-18 | End: 2024-06-18

## 2024-06-18 RX ORDER — PROPOFOL 10 MG/ML
INJECTION, EMULSION INTRAVENOUS CONTINUOUS PRN
Status: DISCONTINUED | OUTPATIENT
Start: 2024-06-18 | End: 2024-06-18

## 2024-06-18 RX ORDER — SODIUM CHLORIDE 0.9 G/100ML
IRRIGANT IRRIGATION AS NEEDED
Status: DISCONTINUED | OUTPATIENT
Start: 2024-06-18 | End: 2024-06-18 | Stop reason: HOSPADM

## 2024-06-18 RX ORDER — FENTANYL CITRATE 50 UG/ML
INJECTION, SOLUTION INTRAMUSCULAR; INTRAVENOUS AS NEEDED
Status: DISCONTINUED | OUTPATIENT
Start: 2024-06-18 | End: 2024-06-18

## 2024-06-18 RX ORDER — FENTANYL CITRATE 50 UG/ML
50 INJECTION, SOLUTION INTRAMUSCULAR; INTRAVENOUS EVERY 5 MIN PRN
Status: DISCONTINUED | OUTPATIENT
Start: 2024-06-18 | End: 2024-06-18 | Stop reason: HOSPADM

## 2024-06-18 RX ORDER — HYDROCODONE BITARTRATE AND ACETAMINOPHEN 5; 325 MG/1; MG/1
1 TABLET ORAL EVERY 6 HOURS PRN
Qty: 10 TABLET | Refills: 0 | Status: SHIPPED | OUTPATIENT
Start: 2024-06-18

## 2024-06-18 RX ORDER — SODIUM CHLORIDE, SODIUM LACTATE, POTASSIUM CHLORIDE, CALCIUM CHLORIDE 600; 310; 30; 20 MG/100ML; MG/100ML; MG/100ML; MG/100ML
100 INJECTION, SOLUTION INTRAVENOUS CONTINUOUS
Status: DISCONTINUED | OUTPATIENT
Start: 2024-06-18 | End: 2024-06-18 | Stop reason: HOSPADM

## 2024-06-18 RX ORDER — FENTANYL CITRATE 50 UG/ML
25 INJECTION, SOLUTION INTRAMUSCULAR; INTRAVENOUS EVERY 5 MIN PRN
Status: DISCONTINUED | OUTPATIENT
Start: 2024-06-18 | End: 2024-06-18 | Stop reason: HOSPADM

## 2024-06-18 RX ORDER — LIDOCAINE HYDROCHLORIDE 10 MG/ML
INJECTION INFILTRATION; PERINEURAL AS NEEDED
Status: DISCONTINUED | OUTPATIENT
Start: 2024-06-18 | End: 2024-06-18 | Stop reason: HOSPADM

## 2024-06-18 RX ORDER — HYDROCODONE BITARTRATE AND ACETAMINOPHEN 5; 325 MG/1; MG/1
1 TABLET ORAL EVERY 6 HOURS PRN
Qty: 10 TABLET | Refills: 0 | Status: SHIPPED | OUTPATIENT
Start: 2024-06-18 | End: 2024-06-18

## 2024-06-18 ASSESSMENT — PAIN - FUNCTIONAL ASSESSMENT
PAIN_FUNCTIONAL_ASSESSMENT: 0-10

## 2024-06-18 ASSESSMENT — PAIN SCALES - GENERAL
PAINLEVEL_OUTOF10: 0 - NO PAIN
PAINLEVEL_OUTOF10: 0 - NO PAIN
PAIN_LEVEL: 0
PAINLEVEL_OUTOF10: 0 - NO PAIN

## 2024-06-18 ASSESSMENT — COLUMBIA-SUICIDE SEVERITY RATING SCALE - C-SSRS
1. IN THE PAST MONTH, HAVE YOU WISHED YOU WERE DEAD OR WISHED YOU COULD GO TO SLEEP AND NOT WAKE UP?: NO
6. HAVE YOU EVER DONE ANYTHING, STARTED TO DO ANYTHING, OR PREPARED TO DO ANYTHING TO END YOUR LIFE?: NO
2. HAVE YOU ACTUALLY HAD ANY THOUGHTS OF KILLING YOURSELF?: NO

## 2024-06-18 NOTE — DISCHARGE INSTRUCTIONS
Follow-Up Instructions    You will need to be seen in clinic in 10-15 days for a post-operative evaluation.  This appointment will be in the outpatient office, not at the [facility].    You will need to call Paola in my office and schedule an appointment, unless there is a previous appointment that appears on your discharge instructions.  Her phone number is [phone].  Please do not delay in calling to make this appointment.      Activity Restrictions    1)  No driving for 24 hours after surgery, due to the anesthetic.    2)  No driving or operating heavy machinery while taking narcotic pain medication.    3)  No weight bearing of the right hand in the splint.  Light use of the fingers (writing, typing, texting) is good to do.     Discharge Medications    A prescription for a narcotic pain medication (Norco) has been sent to your pharmacy of record.  I do not expect you will need this, but wanted you to have it available as an option if over-the-counter medications are not adequately controlling your pain.  Most people simply take Tylenol, Motrin, Advil, or other anti-inflammatory for the pain.  If you do end up taking the prescription medication, please try to wean yourself off this as quickly as possible.    You can add the prescription medication to the anti-inflammatories if needed, but should not add it to Tylenol, as there is already Tylenol in the prescription.    Wound care instructions:     1)  Leave operative splint in place until you are seen in the office.  If you shower, cover the hand with a plastic bag and elevate it so the water cannot run down into the bag.    2)  Call if any drainage after 7 days, increased redness/warmth/swelling at incision site, abnormal pain/tenderness of the extremity, abnormal swelling of the extremity that does not respond to elevation, shortness of breath, or chest pain.

## 2024-06-18 NOTE — ANESTHESIA POSTPROCEDURE EVALUATION
Patient: Adriana Trinidad    Procedure Summary       Date: 06/18/24 Room / Location: [facility] 02 / [facility]    Anesthesia Start: 0823 Anesthesia Stop: 1101    Procedures:       Right Carpal Tunnel Release (Right: Hand)      right flexor tendon release , and small finger tenosynovectomy. FDP-ring to small transfere (Right: Hand) Diagnosis:       Carpal tunnel syndrome on right      Other injury of flexor muscle, fascia and tendon of left ring finger at wrist and hand level, initial encounter      (Carpal tunnel syndrome on right [G56.01])      (Other injury of flexor muscle, fascia and tendon of left ring finger at wrist and hand level, initial encounter [S66.195A])    Surgeons: Paulie Porter MD Responsible Provider: Len Balderas MD    Anesthesia Type: MAC ASA Status: 1            Anesthesia Type: MAC    Vitals Value Taken Time   /84 06/18/24 1105   Temp 36.1 °C (97 °F) 06/18/24 1059   Pulse 82 06/18/24 1059   Resp 16 06/18/24 1059   SpO2 96 % 06/18/24 1059       Anesthesia Post Evaluation    Patient location during evaluation: bedside  Patient participation: complete - patient participated  Level of consciousness: awake  Pain score: 0  Pain management: adequate  Airway patency: patent  Cardiovascular status: acceptable  Respiratory status: acceptable  Hydration status: acceptable  Postoperative Nausea and Vomiting: none        There were no known notable events for this encounter.

## 2024-06-18 NOTE — ANESTHESIA PREPROCEDURE EVALUATION
Patient: Adriana Trinidad    Procedure Information       Date/Time: 06/18/24 0845    Procedures:       Right Carpal Tunnel Release (Right: Hand)      Right Small Finger Flexor Tendon Jersey Repair vs Tendon Prakash Placement (Right: Hand)    Location: [facility] OR 02 / [facility]    Surgeons: Paulie Porter MD            Relevant Problems   Cardiac   (+) Primary hypertension      Neuro   (+) Carpal tunnel syndrome on right      Musculoskeletal   (+) Carpal tunnel syndrome on right       Clinical information reviewed:   Tobacco  Allergies  Meds   Med Hx  Surg Hx   Fam Hx  Soc Hx        NPO/Void Status  Date of Last Liquid: 06/17/24  Time of Last Liquid: 2200  Date of Last Solid: 06/17/24  Time of Last Solid: 2200           Past Medical History:   Diagnosis Date    ADHD (attention deficit hyperactivity disorder)     Anxiety     Arthritis     Carpal tunnel syndrome, right upper limb 01/29/2019    Carpal tunnel syndrome of right wrist    Depression     Encounter for screening for diabetes mellitus 03/20/2017    Screening for diabetes mellitus (DM)    Encounter for screening for diseases of the blood and blood-forming organs and certain disorders involving the immune mechanism 01/28/2020    Screening, anemia, deficiency, iron    Encounter for screening for malignant neoplasm of colon 09/29/2016    Colon cancer screening    Encounter for screening for other suspected endocrine disorder 02/28/2022    Screening for thyroid disorder    Generalized anxiety disorder 09/28/2020    Generalized anxiety disorder    Hypertension     Joint pain     Other specified postprocedural states 03/11/2022    History of Papanicolaou smear    Other specified postprocedural states 01/28/2020    History of Papanicolaou smear    Other specified postprocedural states 07/17/2020    History of Papanicolaou smear    Peripheral neuropathy     Personal history of other diseases of the circulatory system     History of hypertension     Personal history of other malignant neoplasm of skin     History of malignant neoplasm of skin    Psychophysiologic insomnia 04/11/2018    Chronic insomnia    Unilateral primary osteoarthritis, right knee     Osteoarthritis of right knee    Vitamin D deficiency, unspecified 09/30/2016    Vitamin D deficiency      Past Surgical History:   Procedure Laterality Date    FINGER SURGERY Left     NECK SURGERY  08/08/2016    Neck Surgery     Social History     Tobacco Use    Smoking status: Every Day     Types: Cigarettes    Smokeless tobacco: Never    Tobacco comments:     Pt denies any illness in past 30 days     Denies any personal/family reaction or problems with anesthesia     Denies SOB with stairs or activity     Ambulates freely   Vaping Use    Vaping status: Never Used   Substance Use Topics    Alcohol use: Yes     Alcohol/week: 3.0 standard drinks of alcohol     Types: 3 Glasses of wine per week     Comment: weekly    Drug use: Never      Current Outpatient Medications   Medication Instructions    amphetamine-dextroamphetamine (Adderall) 20 mg tablet 20 mg, oral, 2 times daily    diclofenac sodium (VOLTAREN) 4 g, Topical, 4 times daily    FLUoxetine (PROZAC) 40 mg, oral, Daily before breakfast    gabapentin (NEURONTIN) 100 mg, oral, Nightly    hydrOXYzine pamoate (VISTARIL) 25 mg, oral, As needed    potassium chloride 20 mEq/15 mL liquid 20 mEq, oral, Daily    potassium chloride CR (Klor-Con M20) 20 mEq ER tablet 20 mEq, oral, Daily    triamterene-hydrochlorothiazid (Maxzide-25) 37.5-25 mg tablet 1 tablet, oral, Daily      Allergies   Allergen Reactions    Bupropion Headache        Chemistry    Lab Results   Component Value Date/Time     04/23/2024 1509    K 3.3 (L) 04/23/2024 1509     04/23/2024 1509    CO2 29 04/23/2024 1509    BUN 30 (H) 04/23/2024 1509    CREATININE 1.11 (H) 04/23/2024 1509    Lab Results   Component Value Date/Time    CALCIUM 9.9 04/23/2024 1509    ALKPHOS 80 04/23/2024 1509     "AST 17 04/23/2024 1509    ALT 16 04/23/2024 1509    BILITOT 0.2 04/23/2024 1509          Lab Results   Component Value Date/Time    WBC 10.2 02/28/2022 1140    HGB 14.2 02/28/2022 1140    HCT 42.8 02/28/2022 1140     02/28/2022 1140     No results found for: \"PROTIME\", \"PTT\", \"INR\"  No results found for this or any previous visit (from the past 4464 hour(s)).  No results found for this or any previous visit from the past 1095 days.       Visit Vitals  /75   Pulse 75   Temp 36.4 °C (97.5 °F) (Temporal)   Resp 16   Ht 1.549 m (5' 1\")   Wt 70.3 kg (154 lb 15.7 oz)   SpO2 98%   BMI 29.28 kg/m²   OB Status Postmenopausal   Smoking Status Every Day   BSA 1.74 m²        Anesthesia Evaluation      Airway   Mallampati: III  TM distance: >3 FB  Neck ROM: full  Dental      Pulmonary    Cardiovascular   (+) hypertension    Rhythm: regular  Rate: normal    Neuro/Psych      GI/Hepatic/Renal      Endo/Other    Abdominal                       Physical Exam    Airway  Mallampati: III  TM distance: >3 FB  Neck ROM: full     Cardiovascular   Rhythm: regular  Rate: normal     Dental    Pulmonary    Abdominal             Anesthesia Plan    History of general anesthesia?: yes  History of complications of general anesthesia?: no    ASA 1     MAC     intravenous induction   Postoperative administration of opioids is intended.  Anesthetic plan and risks discussed with patient.    Plan discussed with CAA.        "

## 2024-06-19 ASSESSMENT — PAIN SCALES - GENERAL
PAINLEVEL_OUTOF10: 8
PAINLEVEL_OUTOF10: 8

## 2024-06-20 NOTE — OP NOTE
ORTHOPEDIC OPERATIVE NOTE    Name:     Adriana Trinidad  :     1965  Facility:    [facility]  Date of Surgery:   2024     PREOP DX:        1.  Right carpal tunnel syndrome     2.  Right jersey finger injury    POSTOP DX:      1.  Right carpal tunnel syndrome     2.  Copious tenosynovitis, right small finger     3.  Right small FDP tendon rupture in palm    PROCEDURE:     1.  Right carpal tunnel release     2.  Right small finger trigger release and extensive flexor tenosynovectomy     3.  Right FDP-R to -S tendon transfer in palm    SURGEON: JR German MD    RESIDENT/FELLOW/ASSISTANT:  Al Gunderson MD    ANESTHESIA:    MAC sedation plus local    ESTIMATED BLOOD LOSS :   10 ml    TOTAL FLUIDS:     900 cc LR    SPECIMEN:   None    TOURNIQUET TIME:   77 minutes at 250 mmHg    COMPLICATIONS:  None    PATIENT RETURNED TO/CONDITION:  PACU in Good      INDICATIONS:      Adriana Trinidad is a 58 y.o., right-hand-dominant female who presents with numbness and tingling in the median nerve distribution of the right hand that has failed to respond to conservative measures.  She also describes an episode where she was jammed on the tip of the right small finger by a falling object several weeks ago and has subsequently been unable to actively flex the PIP or DIP joints of the finger.  She is here for elective right carpal tunnel release, and exploration of presumed jersey finger injury versus locked trigger finger, right small finger.  I reminded her of surgical risks of infection, scarring, damage to nerves, tendons, or vessels, stiffness, wound healing problems, failure to relieve symptoms, recurrent symptoms, and need for further surgery.  She voiced understanding and wished to proceed with all portions.      NARRATIVE:       Following identification of patient and confirmation of correct sites of surgery and signed operative consent, she was brought to the operating room and a hand table  affixed to the cart.  A light MAC sedation was administered by Anesthesia, along with IV antibiotic dose.  A pneumatic tourniquet was placed high on the right arm, and the limb was prepped from fingertip to cuff with chlorhexidine, and draped free in the usual sterile fashion.  A total of 14 cc of a mix of half percent Marcaine and 1% lidocaine plain was instilled to the planned incision areas.  The limb was exsanguinated with an Esmarch, and the tourniquet inflated.  The hand was secured in a lead hand rivera, and all bony prominences were padded.    A 2 cm mini-open carpal tunnel incision was made and taken carefully bluntly down under high loupe magnification to the palmar fascia, which was divided in line with its fibers.  Further careful blunt dissection revealed the distal edge of the transverse carpal ligament.  The ligament was then sharply, sequentially divided with scissors from distal to proximal, while carefully protecting the carpal tunnel contents themselves.  Complete release was confirmed visually and by digital palpation.  There was good refill of the longitudinal vessel.    Attention was then turned to the presumed jersey finger injury.  A 1 cm chevron incision was made, centered over the DIP flexion crease, and taken carefully bluntly down.  Neurovascular bundles were identified and protected.  The A5 pulley was divided longitudinally, revealing the FDP tendon solidly attached to its distal phalanx insertion.    Attention was then turned proximally, where a 15 mm longitudinal incision was made over the A1 pulley of the right small finger and similarly taken bluntly down.  The neurovascular bundles were mobilized and protected.  The A1 pulley was sharply divided in line with the underlying tendon fibers.  A very small, 3 fiber FDS tendon was noted.  The robust FDP tendon was surrounded by copious inflammatory tenosynovitis.  This was very carefully dissected away from the tendon fibers with  tenotomy scissors and excised.  Proximal traction on the FDP tendon produced full composite flexion of the finger.    Having resolved the presumed locked trigger finger, the tourniquet was deflated, and pink color rapidly returned to all digits.  Meticulous hemostasis was achieved in all 3 incisions with bipolar.  After final irrigation, the carpal tunnel incision was closed with 4-0 Vicryl subcutaneous and 4-0 Monocryl interrupted simple skin stitch.  The trigger incision and distal finger incision were closed with 5-0 chromic simple stitch.    As the patient was recovering from sedation, but before the drapes were taken down, she was able to cooperatively attempt to make a full composite fist.  The index, long, and ring fingers did fully flex.  The small finger flexion was not improved from preoperative.  Anesthesia increased the sedation dose again.  The limb was exsanguinated with an Esmarch, and the tourniquet reinflated, having been down about 15 minutes.    The trigger incision was reopened and extended proximally an additional centimeter.  The FDP tendon was followed proximally, where a second area of synovitis was noted just distal to the carpal tunnel.  At this point I removed the sutures from the carpal tunnel incision and connected the two incisions.  The vascular arch and median and ulnar nerve branches were identified and carefully preserved.  Just distal to the carpal tunnel, the second smaller area of tenosynovitis was cleared from the tendon, and there was an obvious ruptured end.  I carefully explored, both visually and by digital palpation, around the base of the hook of hamate and along the walls of the carpal tunnel and found no roughened areas to explain an attritional rupture.  I believe this was an inflammatory tendon rupture.  FDP-R was carefully explored, and was not involved.    The proximal stump of FDP-S had retracted well proximal to the carpal tunnel.  I elected to proceed with  end-to-side tendon transfer.  A 4-0 FiberLoop suture was placed in the distal stump as the core of an M-Mendenhall repair.  The fingers were placed in normal resting cascade and the tendon was sutured in locking fashion under appropriate tension to the side of the FDP-R.  This was reinforced with a modified Qureshi 4-0 Ethibond and one additional side-to-side figure-of-eight.  The small finger now sat in the normal resting cascade.  Pulling the ring and long fingers passively into full extension allowed the small finger to come out within 1 cm of full extension as well.    The tourniquet was again deflated, and pink color rapidly returned to all digits and skin flaps.  Meticulous hemostasis was achieved again achieved with bipolar.  After final irrigation, the incision was closed with 4-0 Vicryl subcutaneous and 4-0 Monocryl interrupted simple skin stitch.  Xeroform and soft dressing was applied, followed by a dorsal blocking splint with the wrist in neutral, the MPs fully flexed, and allowing free IP motion.  The patient was then awakened and transferred to Recovery in stable condition.          Electronically signed  CINTIA Porter MD  [phone]

## 2024-06-28 ENCOUNTER — OFFICE VISIT (OUTPATIENT)
Dept: ORTHOPEDIC SURGERY | Facility: HOSPITAL | Age: 59
End: 2024-06-28
Payer: COMMERCIAL

## 2024-06-28 DIAGNOSIS — S63.639A JERSEY FINGER, INITIAL ENCOUNTER: ICD-10-CM

## 2024-06-28 DIAGNOSIS — S66.195A OTHER INJURY OF FLEXOR MUSCLE, FASCIA AND TENDON OF LEFT RING FINGER AT WRIST AND HAND LEVEL, INITIAL ENCOUNTER: Primary | ICD-10-CM

## 2024-06-28 PROCEDURE — 99211 OFF/OP EST MAY X REQ PHY/QHP: CPT | Performed by: ORTHOPAEDIC SURGERY

## 2024-06-28 NOTE — PROGRESS NOTES
CHIEF COMPLAINT         Right hand postop f/u    ASSESSMENT + PLAN    Postop day 10 from right carpal tunnel release, small finger tenosynovectomy, and zone 3 FDP-ring to-S tendon transfer    We had a long discussion about the unusual surgical findings of a nontraumatic tendon rupture in the mid hand.  I discussed the tendon transfer.  This is going to need significant Occupational Therapy in order to restore best function.  The referral was given today.  It is okay to move the finger and work on stretching, but you should not , carry, or hold anything with this hand.    A prescription for 15 Norco tablets was transmitted to your pharmacy of choice.    Follow-up with me in 2 weeks for clinical check, or certainly sooner with any interval concerns.        HISTORY OF PRESENT ILLNESS       Patient returns today, postop day 10 from right carpal tunnel release, small finger tenosynovectomy, and zone 3 FDP-ring to -small tendon transfer.  Patient reports pain is decreasing appropriately.  No numbness or tingling.      PHYSICAL EXAM       Postop splint and dressing removed.  Incisions are clean, dry, intact with absorbable suture in place.  Fingers sit in acceptable resting cascade.  Full MP small finger flexion but just a jog of PIP motion.  Intraoperatively there was full composite flexion with traction on the tendon.  Flaps have capillary refill less than 2 seconds, as do all fingertips.  Sensation intact to light touch throughout by blinded confrontation.  Good wrist motion.  Minimal tenodesis motion with this.      IMAGING / LABS / EMGs    None today      Electronically Signed      CINTIA Porter MD      Orthopaedic Hand Surgery      [phone]

## 2024-06-28 NOTE — LETTER
July 12, 2024     Henry Briggs MD  [address]  [address]  [facility] [address]    Patient: Adriana Trinidad   YOB: 1965   Date of Visit: 6/28/2024       Dear Dr. Henry Briggs MD:    Thank you for referring Adriana Trinidad to me for evaluation. Below are my notes for this consultation.  If you have questions, please do not hesitate to call me. I look forward to following your patient along with you.       Sincerely,     Paulie Porter MD      CC: No Recipients  ______________________________________________________________________________________    CHIEF COMPLAINT         Right hand postop f/u    ASSESSMENT + PLAN    Postop day 10 from right carpal tunnel release, small finger tenosynovectomy, and zone 3 FDP-ring to-S tendon transfer    We had a long discussion about the unusual surgical findings of a nontraumatic tendon rupture in the mid hand.  I discussed the tendon transfer.  This is going to need significant Occupational Therapy in order to restore best function.  The referral was given today.  It is okay to move the finger and work on stretching, but you should not , carry, or hold anything with this hand.    A prescription for 15 Norco tablets was transmitted to your pharmacy of choice.    Follow-up with me in 2 weeks for clinical check, or certainly sooner with any interval concerns.        HISTORY OF PRESENT ILLNESS       Patient returns today, postop day 10 from right carpal tunnel release, small finger tenosynovectomy, and zone 3 FDP-ring to -small tendon transfer.  Patient reports pain is decreasing appropriately.  No numbness or tingling.      PHYSICAL EXAM       Postop splint and dressing removed.  Incisions are clean, dry, intact with absorbable suture in place.  Fingers sit in acceptable resting cascade.  Full MP small finger flexion but just a jog of PIP motion.  Intraoperatively there was full composite flexion with traction on the tendon.  Flaps have  capillary refill less than 2 seconds, as do all fingertips.  Sensation intact to light touch throughout by blinded confrontation.  Good wrist motion.  Minimal tenodesis motion with this.      IMAGING / LABS / EMGs    None today      Electronically Signed      CINTIA Porter MD      Orthopaedic Hand Surgery      [phone]

## 2024-07-02 ENCOUNTER — EVALUATION (OUTPATIENT)
Dept: OCCUPATIONAL THERAPY | Facility: CLINIC | Age: 59
End: 2024-07-02
Payer: COMMERCIAL

## 2024-07-02 DIAGNOSIS — S63.639D JERSEY FINGER, SUBSEQUENT ENCOUNTER: Primary | ICD-10-CM

## 2024-07-02 DIAGNOSIS — S63.639A JERSEY FINGER, INITIAL ENCOUNTER: ICD-10-CM

## 2024-07-02 PROCEDURE — 97165 OT EVAL LOW COMPLEX 30 MIN: CPT | Mod: GO

## 2024-07-02 PROCEDURE — L3808 WHFO, RIGID W/O JOINTS: HCPCS

## 2024-07-02 NOTE — PROGRESS NOTES
Occupational Therapy  Occupational Therapy Orthopedic Evaluation    Patient Name: Adriana Trinidad  MRN: [id number]  Today's Date: 7/2/2024  Time Calculation  Start Time: 1020  Stop Time: 1100  Time Calculation (min): 40 min  OT Evaluation Time Entry  OT Evaluation (Low) Time Entry: 20,  ,      Insurance:  Visit number: 1   Authorization info: no auth   Insurance Type: [place]     General:  Reason for visit:  R ring to small finger FDP tendon  transfer   Referred by: Dr. Porter     Current Problem  1. Jersey finger, subsequent encounter        2. Jersey finger, initial encounter  Referral to Occupational Therapy    Follow Up In Occupational Therapy          Precautions: Flexor Tendon Protocol         Medical History Form: Reviewed (scanned into chart)    Subjective:   Chief Complaint: Decreased use of R hand for ADL's and work   Onset: April 2024  LOAN: Work injury. 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 No  Fever/chills, unexplained weight changes, dizziness/fainting, unexplained change in bowel or bladder functions, unexplained malaise or muscle weakness, night pain/sweats, numbness or tingling    Objective:    Right Hand AROM (degrees)   MCP PIP DIP DPC   IF     0   MF    0   RF    0   SF 0/80 0/30 0/5 5 cm   Thumb       Thumb  RABD wnl      Thumb PABD wnl        R Hand PROM (degrees)   MCP PIP DIP DPC   IF        MF       RF       SF 0/80 0/90 0/60    Thumb       Thumb RABD       Thumb PABD          Hand Strength Measures (lbs)   R L   Dynamometer  Not assessed due to precautions    Lateral Pinch     3jaw Pinch          Special Tests  Carpals  Godwin's test:   Lunotriquetral Shuck/Shear:   Midcarpal Shift Test:   DRUJ Instability/Piano Key:     Wrist Tendon   Finkelstein's(DeQuervain's):   CTS  Carpal Compression:   Phalen:   Reverse Phalen:   Tinels at Carpal tunnel:   TFCC   Ulnar Fovea Sign:   TFCC Load Test:   Supination Lift Test:     Finger/Thumb  CMC Grind:   Froment's Sign:   Thumb UCL:   Julia's Sign:   Wartenburg's Sign:   ORL Tightness:   Intrinsic Tightness:   Extrinsic Tightness:   Benson's Test:   Sarthak's Test:     AIN Weakness:   PIN Weakness:     Physical Observation: incision is well healed. Dissolvable sutures still present in incision  Edema: Mild edema R small finger     Sensory: WNL to light touch   Numbness/Tingling: none         Outcome Measures:  UEFI: 3/80    EDUCATION: home exercise program, plan of care, activity modifications, pain management, and injury pathology       Goals:  Pt will be independent with HEP,tendon precautions, don/doffing DBS for optimal outcome and to increase functional use of R hand   Pt will achieve 200 degrees of WIGGINS R small finger to be able to grasp various objects for ADL's and work tasks.  Pt will achieve 50 lbs of  strength to open jars and lift greater than 10 lbs with R hand for work tasks  Pt will score a 50 or better on UEFI  Pt will improve WIGGINS by 50% in 6 weeks to groom hair and tie shoes  Pt will have 0/10 R hand pain while  gripping with R hand during ADL's       Plan of care was developed with input and agreement by the patient    Treatments:     Modalities:       min       Therapeutic Exercise:    min       Manual Therapy:      min  Passive composite flexion of  small finger      Therapeutic Activity:     min       Neuromuscular Re-education:   min       Orthosis:      20 min  Fabricated Dorsal blocking orthosis for protection of tendon repair     Wound Care:      min      Self Care:       min      Other Treatment:     min      Assessment: Patient is a 57 yo RHD female  s/p R ring to small finger FDP tendon transfer  resulting in limited participation in pain-free ADLs and inability to perform at their prior level of function. Pt would benefit from occupational therapy to address the impairments found & listed previously in the objective section in order to return to safe and pain-free ADLs and prior level of function.       Plan:      Planned Interventions include: therapeutic exercise, therapeutic activity, self-care home management, manual therapy, therapeutic activities, gait training, neuromuscular coordination, vasopneumatic, dry needling, aquatic therapy, electric stimulation, fluidotherapy, ultrasound, kinesiotaping, orthosis fabrication, wound care  Frequency: 1-2 x Week  Duration: 12 Weeks    Redd Michael, OT

## 2024-07-09 ENCOUNTER — TREATMENT (OUTPATIENT)
Dept: OCCUPATIONAL THERAPY | Facility: CLINIC | Age: 59
End: 2024-07-09
Payer: COMMERCIAL

## 2024-07-09 DIAGNOSIS — S63.639D JERSEY FINGER, SUBSEQUENT ENCOUNTER: Primary | ICD-10-CM

## 2024-07-09 DIAGNOSIS — S63.639A JERSEY FINGER, INITIAL ENCOUNTER: ICD-10-CM

## 2024-07-09 PROCEDURE — 97110 THERAPEUTIC EXERCISES: CPT | Mod: GO

## 2024-07-09 PROCEDURE — 97140 MANUAL THERAPY 1/> REGIONS: CPT | Mod: GO

## 2024-07-09 NOTE — PROGRESS NOTES
"Occupational Therapy    Occupational Therapy Treatment    Name: Adriana Trinidad  MRN: [id number]  : 1965  Date: 24  Time Calculation  Start Time: 1008  Stop Time: 1058  Time Calculation (min): 50 min  OT Therapeutic Procedures Time Entry  Manual Therapy Time Entry: 20  Therapeutic Exercise Time Entry: 30,    Insurance:  Visit number: 2   Authorization info: no auth   Insurance Type: [place]   Assessment: Pt needed instruction to perform exercises correctly. Scar tissue is more firm and restrictive. Pt demonstrated slight improvement with tendon gliding post tx, but has limited tendon excursion.     Plan: Continue to work on P/AROM exercises, scar massage, and gentle blocking        Subjective \"My finger feels really stiff\"        Pain Assessment:   2/10 around periphery of sx scar     Objective  Right Hand AROM (degrees)    MCP PIP DIP DPC   IF        0   MF       0   RF       0   SF 0/80 0/30 0/5 5 cm   Thumb           Thumb RABD wnl         Thumb PABD wnl             R Hand PROM (degrees)    MCP PIP DIP DPC   IF            MF           RF           SF 0/80 0/90 0/60     Thumb           Thumb RABD           Thumb PABD             Modalities:     Communication:     Splinting:     Therapeutic Exercise  Hook fist x 10x3  Tenodesis x 10x3   Reverse blocking x 10x3   Gentle blocking for IP flexion x 10x3     Manual Therapy   Scar massage   Passive composite finger flexion stretch   PIPJ extension stretch with MPJ in flexion     Therapy/Activity:   Strength:     Other Activity:  Removed remaining dissolvable sutures from incision and cleaned dried blood from scar. Pt instructed in washing hand and scar massage      Outcome Measures:      OP EDUCATION:Reviewed HEP, scar massage, and precautions       Goals:    Pt will be independent with HEP,tendon precautions, don/doffing DBS for optimal outcome and to increase functional use of R hand   Pt will achieve 200 degrees of WIGGINS R small finger to be able to grasp " various objects for ADL's and work tasks.  Pt will achieve 50 lbs of  strength to open jars and lift greater than 10 lbs with R hand for work tasks  Pt will score a 50 or better on UEFI  Pt will improve WIGGINS by 50% in 6 weeks to groom hair and tie shoes  Pt will have 0/10 R hand pain while  gripping with R hand during ADL's

## 2024-07-10 ENCOUNTER — HOSPITAL ENCOUNTER (OUTPATIENT)
Dept: RADIOLOGY | Facility: CLINIC | Age: 59
End: 2024-07-10
Payer: COMMERCIAL

## 2024-07-10 ENCOUNTER — APPOINTMENT (OUTPATIENT)
Dept: RADIOLOGY | Facility: CLINIC | Age: 59
End: 2024-07-10
Payer: COMMERCIAL

## 2024-07-12 ENCOUNTER — OFFICE VISIT (OUTPATIENT)
Dept: ORTHOPEDIC SURGERY | Facility: HOSPITAL | Age: 59
End: 2024-07-12
Payer: COMMERCIAL

## 2024-07-12 ENCOUNTER — APPOINTMENT (OUTPATIENT)
Dept: OCCUPATIONAL THERAPY | Facility: CLINIC | Age: 59
End: 2024-07-12
Payer: COMMERCIAL

## 2024-07-12 DIAGNOSIS — S66.195D: Primary | ICD-10-CM

## 2024-07-12 PROCEDURE — 99211 OFF/OP EST MAY X REQ PHY/QHP: CPT | Performed by: ORTHOPAEDIC SURGERY

## 2024-07-12 NOTE — LETTER
July 21, 2024     Henry Briggs MD  [address]  [address]  [facility] [address]    Patient: Adriana Trinidad   YOB: 1965   Date of Visit: 7/12/2024       Dear Dr. Henry Briggs MD:    Thank you for referring Adriana Trinidad to me for evaluation. Below are my notes for this consultation.  If you have questions, please do not hesitate to call me. I look forward to following your patient along with you.       Sincerely,     Paulie Porter MD      CC: No Recipients  ______________________________________________________________________________________    CHIEF COMPLAINT         Right hand f/u    ASSESSMENT + PLAN    Postop day 23 from right carpal tunnel release, tenosynovectomy, trigger release, and FDP R-S tendon transfer    The incisions are healing normally.  There is no sign of infection.  Motion of all other digits than the small has normalized.  Unfortunately, as we discussed, the small finger motion is little better than preoperatively.  While passive motion is full, there has not been much improvement in the active motion.    We had a long discussion about potential reasons for this, the stitch may be broken, the tendon may be torn, or the stitch may have slipped in the ring tendon.  I think this last 1 is the most likely.  It is too early for the tendons to have scarred down and have that blocking the motion.    We discussed the option of surgical exploration again and various technical ways to try to improve the situation and have a better outcome from further surgery.  We also discussed the option of simply living with the functional deficit for a while until you are better established in your new jobs and able to take more time for rehab.  I do not think there is any downside to waiting for the surgery.    You no longer need the splint.  Paperwork was completed for return to work full duty on July 18.  Advance activities as symptoms allow, with no further  restrictions.    Stay in touch with my office.  Follow-up with any additional concerns.        HISTORY OF PRESENT ILLNESS       Patient returns today, 2 weeks after last visit and now postop day 23 from right carpal tunnel release, right small trigger release, extensive flexor tenosynovectomy, and FDP R-S tendon transfer (end to side).  Unfortunately there has not been any significant improvement in the motion of the small finger.  The tingling is gone, other than just a little residual in the ulnar small finger.  The postoperative discomfort has largely resolved.  No interval trauma.  No new concerns.  She is eager to get back to a new job.      PHYSICAL EXAM       Splint removed.  Incisions are clean, dry, intact with no sign of infection.  Sensation intact to light touch by blinded confrontation in all distributions, with just a little residual tingling in the ulnar half of the small finger.  Signal clearly gets through.  Normal thenar motor function.  Small finger MP active motion 0 to 80 degrees, as preoperatively.  Just a visible jog of PIP motion actively or with wrist tenodesis test.  Passively there is full composite flexion of the small finger with no significant pain.  Capillary refill less than 2 seconds throughout.  2+ radial and ulnar pulses.  Symmetric wrist motion.      IMAGING / LABS / EMGs    None today      Electronically Signed      CINTIA Porter MD      Orthopaedic Hand Surgery      [phone]

## 2024-07-12 NOTE — LETTER
July 12, 2024     Patient: Adriana Trinidad   YOB: 1965   Date of Visit: 7/12/2024       To Whom It May Concern:    It is my medical opinion that Adriana Trinidad may return to work on 7/18/24 with no restrictions .    If you have any questions or concerns, please don't hesitate to call.         Sincerely,        Paulie Porter MD    CC: No Recipients

## 2024-07-12 NOTE — PROGRESS NOTES
CHIEF COMPLAINT         Right hand f/u    ASSESSMENT + PLAN    Postop day 23 from right carpal tunnel release, tenosynovectomy, trigger release, and FDP R-S tendon transfer    The incisions are healing normally.  There is no sign of infection.  Motion of all other digits than the small has normalized.  Unfortunately, as we discussed, the small finger motion is little better than preoperatively.  While passive motion is full, there has not been much improvement in the active motion.    We had a long discussion about potential reasons for this, the stitch may be broken, the tendon may be torn, or the stitch may have slipped in the ring tendon.  I think this last 1 is the most likely.  It is too early for the tendons to have scarred down and have that blocking the motion.    We discussed the option of surgical exploration again and various technical ways to try to improve the situation and have a better outcome from further surgery.  We also discussed the option of simply living with the functional deficit for a while until you are better established in your new jobs and able to take more time for rehab.  I do not think there is any downside to waiting for the surgery.    You no longer need the splint.  Paperwork was completed for return to work full duty on July 18.  Advance activities as symptoms allow, with no further restrictions.    Stay in touch with my office.  Follow-up with any additional concerns.        HISTORY OF PRESENT ILLNESS       Patient returns today, 2 weeks after last visit and now postop day 23 from right carpal tunnel release, right small trigger release, extensive flexor tenosynovectomy, and FDP R-S tendon transfer (end to side).  Unfortunately there has not been any significant improvement in the motion of the small finger.  The tingling is gone, other than just a little residual in the ulnar small finger.  The postoperative discomfort has largely resolved.  No interval trauma.  No new concerns.   She is eager to get back to a new job.      PHYSICAL EXAM       Splint removed.  Incisions are clean, dry, intact with no sign of infection.  Sensation intact to light touch by blinded confrontation in all distributions, with just a little residual tingling in the ulnar half of the small finger.  Signal clearly gets through.  Normal thenar motor function.  Small finger MP active motion 0 to 80 degrees, as preoperatively.  Just a visible jog of PIP motion actively or with wrist tenodesis test.  Passively there is full composite flexion of the small finger with no significant pain.  Capillary refill less than 2 seconds throughout.  2+ radial and ulnar pulses.  Symmetric wrist motion.      IMAGING / LABS / EMGs    None today      Electronically Signed      CINTIA Porter MD      Orthopaedic Hand Surgery      [phone]

## 2024-07-16 ENCOUNTER — APPOINTMENT (OUTPATIENT)
Dept: OCCUPATIONAL THERAPY | Facility: CLINIC | Age: 59
End: 2024-07-16
Payer: COMMERCIAL

## 2024-07-18 ENCOUNTER — APPOINTMENT (OUTPATIENT)
Dept: ORTHOPEDIC SURGERY | Facility: CLINIC | Age: 59
End: 2024-07-18
Payer: COMMERCIAL

## 2024-07-18 ENCOUNTER — APPOINTMENT (OUTPATIENT)
Dept: OCCUPATIONAL THERAPY | Facility: CLINIC | Age: 59
End: 2024-07-18
Payer: COMMERCIAL

## 2024-07-23 ENCOUNTER — APPOINTMENT (OUTPATIENT)
Dept: OCCUPATIONAL THERAPY | Facility: CLINIC | Age: 59
End: 2024-07-23
Payer: COMMERCIAL

## 2024-07-25 ENCOUNTER — APPOINTMENT (OUTPATIENT)
Dept: OCCUPATIONAL THERAPY | Facility: CLINIC | Age: 59
End: 2024-07-25
Payer: COMMERCIAL

## 2024-10-25 ENCOUNTER — OFFICE VISIT (OUTPATIENT)
Dept: PRIMARY CARE | Facility: CLINIC | Age: 59
End: 2024-10-25
Payer: COMMERCIAL

## 2024-10-25 VITALS
HEIGHT: 61 IN | DIASTOLIC BLOOD PRESSURE: 84 MMHG | WEIGHT: 156 LBS | HEART RATE: 85 BPM | BODY MASS INDEX: 29.45 KG/M2 | OXYGEN SATURATION: 97 % | SYSTOLIC BLOOD PRESSURE: 143 MMHG

## 2024-10-25 DIAGNOSIS — M17.11 ARTHRITIS OF KNEE, RIGHT: ICD-10-CM

## 2024-10-25 DIAGNOSIS — Z23 NEED FOR VACCINATION: ICD-10-CM

## 2024-10-25 DIAGNOSIS — Z78.0 ASYMPTOMATIC MENOPAUSE: ICD-10-CM

## 2024-10-25 DIAGNOSIS — Z13.220 SCREENING FOR HYPERLIPIDEMIA: ICD-10-CM

## 2024-10-25 DIAGNOSIS — I10 HYPERTENSION, UNSPECIFIED TYPE: ICD-10-CM

## 2024-10-25 DIAGNOSIS — I10 PRIMARY HYPERTENSION: Primary | Chronic | ICD-10-CM

## 2024-10-25 DIAGNOSIS — G89.29 CHRONIC PAIN OF RIGHT KNEE: ICD-10-CM

## 2024-10-25 DIAGNOSIS — Z12.31 VISIT FOR SCREENING MAMMOGRAM: ICD-10-CM

## 2024-10-25 DIAGNOSIS — E87.6 HYPOKALEMIA: ICD-10-CM

## 2024-10-25 DIAGNOSIS — M25.561 CHRONIC PAIN OF RIGHT KNEE: ICD-10-CM

## 2024-10-25 RX ORDER — LIDOCAINE HYDROCHLORIDE 10 MG/ML
1 INJECTION, SOLUTION INFILTRATION; PERINEURAL
Status: COMPLETED | OUTPATIENT
Start: 2024-10-25 | End: 2024-10-25

## 2024-10-25 RX ORDER — TRIAMTERENE/HYDROCHLOROTHIAZID 37.5-25 MG
1 TABLET ORAL DAILY
Qty: 90 TABLET | Refills: 2 | Status: SHIPPED | OUTPATIENT
Start: 2024-10-25

## 2024-10-25 RX ORDER — POTASSIUM CHLORIDE 20 MEQ/1
20 TABLET, EXTENDED RELEASE ORAL DAILY
Qty: 30 TABLET | Refills: 2 | Status: SHIPPED | OUTPATIENT
Start: 2024-10-25

## 2024-10-25 RX ORDER — TRIAMCINOLONE ACETONIDE 40 MG/ML
80 INJECTION, SUSPENSION INTRA-ARTICULAR; INTRAMUSCULAR
Status: COMPLETED | OUTPATIENT
Start: 2024-10-25 | End: 2024-10-25

## 2024-10-25 ASSESSMENT — ENCOUNTER SYMPTOMS: CONSTITUTIONAL NEGATIVE: 1

## 2024-10-25 NOTE — PROGRESS NOTES
"Patient ID: Adriana Trinidad is a 58 y.o. female who presents for Follow-up.    /84   Pulse 85   Ht 1.549 m (5' 1\")   Wt 70.8 kg (156 lb)   SpO2 97%   BMI 29.48 kg/m²     HPI      HTN ON MEDICATIONS CONTROLLED   NO CP, NO SOB , NO PND , NO ORTHOPNEA, NO LEG SWELLING   NO PALPITATION , NO HEADACHE       GENERALIZED ANXIETY DISORDER   ON PROXZAC 40MG A DAY       PT HAVING RT KNEE PAIN   HURTS TO WALK   HURTS TO GO UP AND DOWN THE STAIRS   NO SWELLING   FEELS TIGHT   PAIN SCALE 6-7/10         Subjective     Review of Systems   Constitutional: Negative.    All other systems reviewed and are negative.      Objective     Physical Exam  Vitals reviewed.   Neck:      Vascular: No carotid bruit.   Cardiovascular:      Rate and Rhythm: Normal rate and regular rhythm.      Pulses: Normal pulses.      Heart sounds: Normal heart sounds. No murmur heard.  Pulmonary:      Effort: Pulmonary effort is normal.      Breath sounds: Normal breath sounds.   Musculoskeletal:      Right lower leg: No edema.      Left lower leg: No edema.      Comments: RT KNEE ROM PAINFUL ON EXTREME FLEXION   NO SOFT TISSUE SWELLING   NO ERYTHEMA    Skin:     Capillary Refill: Capillary refill takes more than 3 seconds.   Neurological:      General: No focal deficit present.      Mental Status: She is oriented to person, place, and time. Mental status is at baseline.   Psychiatric:         Mood and Affect: Mood normal.         Behavior: Behavior normal.         Thought Content: Thought content normal.         Judgment: Judgment normal.         Lab Results   Component Value Date    WBC 10.2 02/28/2022    HGB 14.2 02/28/2022    HCT 42.8 02/28/2022    MCV 98 02/28/2022     02/28/2022           Problem List Items Addressed This Visit       Primary hypertension - Primary    Relevant Orders    Comprehensive metabolic panel     Other Visit Diagnoses       Chronic pain of right knee        Arthritis of knee, right        Need for vaccination     "    Screening for hyperlipidemia        Relevant Orders    Lipid panel    Visit for screening mammogram        Relevant Orders    BI mammo bilateral screening tomosynthesis    Hypertension, unspecified type        Relevant Medications    triamterene-hydrochlorothiazid (Maxzide-25) 37.5-25 mg tablet    Hypokalemia        Relevant Medications    potassium chloride CR (Klor-Con M20) 20 mEq ER tablet    Asymptomatic menopause        Relevant Orders    XR DEXA bone density               A/P         AFTER ASEPTIC PRECAUTION , INJECTION KENALOG 80MG   GIVEN RT KNEE   PT TOLERATED THE PROCEDURE WELL         Patient ID: Adriana Trinidad is a 58 y.o. female.    Joint Injection Large/Arthrocentesis: R knee on 10/25/2024 5:12 PM  Indications: pain (RT KNEE )  Details: 22 G needle, anterolateral approach (guidance: UNDER CLINICAL GUIDANCE )  Medications: 80 mg triamcinolone acetonide 40 mg/mL; 1 mL lidocaine 10 mg/mL (1 %)    AFTER ASEPTIC PRECAUTION , STERILIZING THE SKIN OF THE RT KNEE WITH ALCOHOL SWAB X3   AND SPRAYING WITH TOPICAL ANESTHETIC TO ADEQUATELY NUMB THE AREA INJECTION KENALOG GIVEN PAIN IS IMMIDIATELY RELIEVED AND NO COMPLICATIONS NOTED

## 2024-11-04 ENCOUNTER — TELEPHONE (OUTPATIENT)
Dept: PRIMARY CARE | Facility: CLINIC | Age: 59
End: 2024-11-04
Payer: COMMERCIAL

## 2024-11-04 NOTE — TELEPHONE ENCOUNTER
Pt called stating that she has a sore throat cough and congestion  and a headache for three days but her Covid test is negative wants to know if you can prescribe something or if there is something over the counter she can take?

## 2025-01-09 ENCOUNTER — HOSPITAL ENCOUNTER (OUTPATIENT)
Dept: RADIOLOGY | Facility: CLINIC | Age: 60
Discharge: HOME | End: 2025-01-09
Payer: COMMERCIAL

## 2025-01-09 DIAGNOSIS — Z12.31 VISIT FOR SCREENING MAMMOGRAM: ICD-10-CM

## 2025-01-09 DIAGNOSIS — Z78.0 ASYMPTOMATIC MENOPAUSE: ICD-10-CM

## 2025-01-09 PROCEDURE — 77080 DXA BONE DENSITY AXIAL: CPT | Performed by: RADIOLOGY

## 2025-01-09 PROCEDURE — 77080 DXA BONE DENSITY AXIAL: CPT

## 2025-02-06 ENCOUNTER — HOSPITAL ENCOUNTER (OUTPATIENT)
Dept: RADIOLOGY | Facility: CLINIC | Age: 60
Discharge: HOME | End: 2025-02-06
Payer: COMMERCIAL

## 2025-02-06 VITALS — WEIGHT: 156 LBS | BODY MASS INDEX: 29.45 KG/M2 | HEIGHT: 61 IN

## 2025-02-06 PROCEDURE — 77067 SCR MAMMO BI INCL CAD: CPT

## 2025-02-06 PROCEDURE — 77067 SCR MAMMO BI INCL CAD: CPT | Performed by: RADIOLOGY

## 2025-02-06 PROCEDURE — 77063 BREAST TOMOSYNTHESIS BI: CPT | Performed by: RADIOLOGY

## 2025-02-13 ENCOUNTER — APPOINTMENT (OUTPATIENT)
Dept: ORTHOPEDIC SURGERY | Facility: HOSPITAL | Age: 60
End: 2025-02-13
Payer: COMMERCIAL

## 2025-03-18 ENCOUNTER — APPOINTMENT (OUTPATIENT)
Dept: PRIMARY CARE | Facility: CLINIC | Age: 60
End: 2025-03-18
Payer: COMMERCIAL

## 2025-03-19 ENCOUNTER — APPOINTMENT (OUTPATIENT)
Dept: PRIMARY CARE | Facility: CLINIC | Age: 60
End: 2025-03-19
Payer: COMMERCIAL

## 2025-03-19 ENCOUNTER — HOSPITAL ENCOUNTER (OUTPATIENT)
Dept: RADIOLOGY | Facility: CLINIC | Age: 60
Discharge: HOME | End: 2025-03-19
Payer: COMMERCIAL

## 2025-03-19 VITALS
WEIGHT: 157.6 LBS | HEIGHT: 61 IN | BODY MASS INDEX: 29.76 KG/M2 | DIASTOLIC BLOOD PRESSURE: 68 MMHG | HEART RATE: 77 BPM | SYSTOLIC BLOOD PRESSURE: 134 MMHG | OXYGEN SATURATION: 98 %

## 2025-03-19 DIAGNOSIS — F17.200 SMOKER: ICD-10-CM

## 2025-03-19 DIAGNOSIS — I10 PRIMARY HYPERTENSION: ICD-10-CM

## 2025-03-19 DIAGNOSIS — I10 HYPERTENSION, UNSPECIFIED TYPE: ICD-10-CM

## 2025-03-19 DIAGNOSIS — F41.1 GAD (GENERALIZED ANXIETY DISORDER): ICD-10-CM

## 2025-03-19 DIAGNOSIS — E55.9 VITAMIN D DEFICIENCY: ICD-10-CM

## 2025-03-19 DIAGNOSIS — Z00.00 ANNUAL PHYSICAL EXAM: Primary | ICD-10-CM

## 2025-03-19 DIAGNOSIS — Z13.29 SCREENING FOR THYROID DISORDER: ICD-10-CM

## 2025-03-19 DIAGNOSIS — Z12.31 VISIT FOR SCREENING MAMMOGRAM: ICD-10-CM

## 2025-03-19 PROCEDURE — 3075F SYST BP GE 130 - 139MM HG: CPT | Performed by: INTERNAL MEDICINE

## 2025-03-19 PROCEDURE — 71046 X-RAY EXAM CHEST 2 VIEWS: CPT | Performed by: RADIOLOGY

## 2025-03-19 PROCEDURE — 99396 PREV VISIT EST AGE 40-64: CPT | Performed by: INTERNAL MEDICINE

## 2025-03-19 PROCEDURE — 71046 X-RAY EXAM CHEST 2 VIEWS: CPT

## 2025-03-19 PROCEDURE — 3078F DIAST BP <80 MM HG: CPT | Performed by: INTERNAL MEDICINE

## 2025-03-19 PROCEDURE — 3008F BODY MASS INDEX DOCD: CPT | Performed by: INTERNAL MEDICINE

## 2025-03-19 PROCEDURE — 93000 ELECTROCARDIOGRAM COMPLETE: CPT | Performed by: INTERNAL MEDICINE

## 2025-03-19 RX ORDER — TRIAMTERENE/HYDROCHLOROTHIAZID 37.5-25 MG
1 TABLET ORAL DAILY
Qty: 90 TABLET | Refills: 2 | Status: SHIPPED | OUTPATIENT
Start: 2025-03-19

## 2025-03-19 ASSESSMENT — PATIENT HEALTH QUESTIONNAIRE - PHQ9
SUM OF ALL RESPONSES TO PHQ9 QUESTIONS 1 AND 2: 0
1. LITTLE INTEREST OR PLEASURE IN DOING THINGS: NOT AT ALL
2. FEELING DOWN, DEPRESSED OR HOPELESS: NOT AT ALL

## 2025-03-19 ASSESSMENT — ENCOUNTER SYMPTOMS: CONSTITUTIONAL NEGATIVE: 1

## 2025-03-19 NOTE — PROGRESS NOTES
"Patient ID: Adriana Trinidad is a 59 y.o. female who presents for Physical.    /68 (BP Location: Left arm, Patient Position: Sitting, BP Cuff Size: Adult)   Pulse 77   Ht 1.549 m (5' 1\")   Wt 71.5 kg (157 lb 9.6 oz)   SpO2 98%   BMI 29.78 kg/m²     HPI      Hypertension on medications controlled  No chest pain, no shortness of breath, no PND, no orthopnea,  No leg swelling, no palpitation, no headache,      No other specific concerns    No other specific questions      HUMARIA STABLE     Subjective     Review of Systems   Constitutional: Negative.    All other systems reviewed and are negative.      Objective     Physical Exam  Vitals and nursing note reviewed.   Constitutional:       Appearance: Normal appearance. She is obese.   Neck:      Vascular: No carotid bruit.   Cardiovascular:      Rate and Rhythm: Normal rate and regular rhythm.      Pulses: Normal pulses.      Heart sounds: Normal heart sounds. No murmur heard.  Pulmonary:      Effort: Pulmonary effort is normal.      Breath sounds: Normal breath sounds.   Abdominal:      Palpations: There is no mass.   Musculoskeletal:      Right lower leg: No edema.      Left lower leg: No edema.   Skin:     Capillary Refill: Capillary refill takes more than 3 seconds.   Neurological:      General: No focal deficit present.      Mental Status: She is oriented to person, place, and time. Mental status is at baseline.   Psychiatric:         Mood and Affect: Mood normal.         Behavior: Behavior normal.         Thought Content: Thought content normal.         Judgment: Judgment normal.         Lab Results   Component Value Date    WBC 10.2 02/28/2022    HGB 14.2 02/28/2022    HCT 42.8 02/28/2022    MCV 98 02/28/2022     02/28/2022           Problem List Items Addressed This Visit       Primary hypertension    HUMAIRA (generalized anxiety disorder)     Other Visit Diagnoses       Annual physical exam    -  Primary    Relevant Orders    Comprehensive metabolic " panel    Lipid panel    ECG 12 lead (Clinic Performed)    Screening for thyroid disorder        Relevant Orders    Tsh With Reflex To Free T4 If Abnormal    Vitamin D deficiency        Relevant Orders    Vitamin D 25-Hydroxy,Total (for eval of Vitamin D levels)    Smoker        Relevant Orders    XR chest 2 views    Hypertension, unspecified type        Relevant Medications    triamterene-hydrochlorothiazid (Maxzide-25) 37.5-25 mg tablet                A/P       CMP, LIPID, TSH , VITAMIN D , EKG   CHEST X-RAY   Discussed with the patient the effect of smoking on overall all-cause mortality  Discussed with the patient the effect of smoking on cardiovascular and cerebrovascular health  Discussed with the patient the effect of smoking on peripheral vascular disease, osteoporosis,, cancer/malignancy   Follow-up 6 months

## 2025-03-20 DIAGNOSIS — E55.9 VITAMIN D DEFICIENCY: Primary | ICD-10-CM

## 2025-03-20 LAB
25(OH)D3+25(OH)D2 SERPL-MCNC: 30 NG/ML (ref 30–100)
ALBUMIN SERPL-MCNC: 4.8 G/DL (ref 3.6–5.1)
ALP SERPL-CCNC: 81 U/L (ref 37–153)
ALT SERPL-CCNC: 15 U/L (ref 6–29)
ANION GAP SERPL CALCULATED.4IONS-SCNC: 12 MMOL/L (CALC) (ref 7–17)
AST SERPL-CCNC: 19 U/L (ref 10–35)
BILIRUB SERPL-MCNC: 0.3 MG/DL (ref 0.2–1.2)
BUN SERPL-MCNC: 24 MG/DL (ref 7–25)
CALCIUM SERPL-MCNC: 10 MG/DL (ref 8.6–10.4)
CHLORIDE SERPL-SCNC: 98 MMOL/L (ref 98–110)
CHOLEST SERPL-MCNC: 175 MG/DL
CHOLEST/HDLC SERPL: 2.7 (CALC)
CO2 SERPL-SCNC: 28 MMOL/L (ref 20–32)
CREAT SERPL-MCNC: 0.87 MG/DL (ref 0.5–1.05)
EGFRCR SERPLBLD CKD-EPI 2021: 76 ML/MIN/1.73M2
GLUCOSE SERPL-MCNC: 95 MG/DL (ref 65–99)
HDLC SERPL-MCNC: 64 MG/DL
LDLC SERPL CALC-MCNC: 84 MG/DL (CALC)
NONHDLC SERPL-MCNC: 111 MG/DL (CALC)
POTASSIUM SERPL-SCNC: 4.1 MMOL/L (ref 3.5–5.3)
PROT SERPL-MCNC: 7.3 G/DL (ref 6.1–8.1)
SODIUM SERPL-SCNC: 138 MMOL/L (ref 135–146)
TRIGL SERPL-MCNC: 178 MG/DL
TSH SERPL-ACNC: 2.78 MIU/L (ref 0.4–4.5)

## 2025-03-20 RX ORDER — ERGOCALCIFEROL 1.25 MG/1
50000 CAPSULE ORAL
Qty: 6 CAPSULE | Refills: 2 | Status: SHIPPED | OUTPATIENT
Start: 2025-03-30 | End: 2025-05-11

## 2025-04-17 ENCOUNTER — OFFICE VISIT (OUTPATIENT)
Dept: ORTHOPEDIC SURGERY | Facility: HOSPITAL | Age: 60
End: 2025-04-17
Payer: COMMERCIAL

## 2025-04-17 DIAGNOSIS — M65.841 OTHER SYNOVITIS AND TENOSYNOVITIS, RIGHT HAND: Primary | ICD-10-CM

## 2025-04-17 PROCEDURE — 99213 OFFICE O/P EST LOW 20 MIN: CPT | Performed by: ORTHOPAEDIC SURGERY

## 2025-04-18 ASSESSMENT — ENCOUNTER SYMPTOMS
TROUBLE SWALLOWING: 0
WHEEZING: 0
EYE DISCHARGE: 0
JOINT SWELLING: 0
CHILLS: 0
SINUS PRESSURE: 0
WOUND: 0
ARTHRALGIAS: 1
SHORTNESS OF BREATH: 0
FEVER: 0
NERVOUS/ANXIOUS: 0

## 2025-04-18 ASSESSMENT — PATIENT HEALTH QUESTIONNAIRE - PHQ9
1. LITTLE INTEREST OR PLEASURE IN DOING THINGS: NOT AT ALL
2. FEELING DOWN, DEPRESSED OR HOPELESS: NOT AT ALL
SUM OF ALL RESPONSES TO PHQ9 QUESTIONS 1 AND 2: 0

## 2025-04-18 ASSESSMENT — PAIN - FUNCTIONAL ASSESSMENT: PAIN_FUNCTIONAL_ASSESSMENT: 0-10

## 2025-04-18 ASSESSMENT — PAIN SCALES - GENERAL: PAINLEVEL_OUTOF10: 4

## 2025-04-18 ASSESSMENT — COLUMBIA-SUICIDE SEVERITY RATING SCALE - C-SSRS
2. HAVE YOU ACTUALLY HAD ANY THOUGHTS OF KILLING YOURSELF?: NO
1. IN THE PAST MONTH, HAVE YOU WISHED YOU WERE DEAD OR WISHED YOU COULD GO TO SLEEP AND NOT WAKE UP?: NO
6. HAVE YOU EVER DONE ANYTHING, STARTED TO DO ANYTHING, OR PREPARED TO DO ANYTHING TO END YOUR LIFE?: NO

## 2025-04-18 NOTE — PROGRESS NOTES
Reason for Appointment  Chief Complaint   Patient presents with    Right Hand - Pain     History of Present Illness  New patient is a 59 y.o. female here today for a new [facility] case evaluation of her right small finger.  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  She was lifting a heavy box of frozen bread onto an overhead shelf when the box started to slide and to stop the box from hitting her head she put up her right hand and injured her right small finger.  She continued to work with the finger taped for about 3 weeks.  She also had numbness and tingling in her right hand.  She had seen Dr. Porter in the past for the right hand and carpal tunnel syndrome and was evaluated by him for possible flexor tendon injury.  She had surgery with Dr. Porter on 6/18/2024 and had a right carpal tunnel release and right small finger tendon transfer.  She has continued to work as a cook but has minimal flexion of the right small finger, the finger gets caught on things and she has weakness with  strength due to the stiffness in the small finger.  She did follow-up with Dr. Porter after surgery and he recommended revision surgery but she did not proceed with this.  MRI done on 11/21/2024 is reviewed and is normal.    Medical History[1]    Surgical History[2]    Medication Documentation Review Audit       Reviewed by Henry Briggs MD (Physician) on 03/19/25 at 0956      Medication Order Taking? Sig Documenting Provider Last Dose Status   amphetamine-dextroamphetamine (Adderall) 20 mg tablet [id number] Yes Take 1 tablet (20 mg) by mouth 2 times a day. Historical Provider, MD 6/17/2024 Active   diclofenac sodium (Voltaren) 1 % gel [id number] No Apply 4.5 inches (4 g) topically 4 times a day.   Patient not taking: Reported on 3/19/2025    Henry Briggs MD Past Week Active   FLUoxetine (PROzac) 40 mg capsule [id number] Yes Take 1 capsule (40 mg) by mouth once daily in the morning.  Take before meals. Historical Provider, MD 2024 Active   gabapentin (Neurontin) 100 mg capsule [id number] No Take 1 capsule (100 mg) by mouth once daily at bedtime. Henry Briggs MD Past Week  24 3560   HYDROcodone-acetaminophen (Norco) 5-325 mg tablet [id number]  Take 1 tablet by mouth every 6 hours if needed for severe pain (7 - 10).   Patient not taking: Reported on 3/19/2025    Reji Gunderson MD  Active   HYDROcodone-acetaminophen (Norco) 5-325 mg tablet [id number]  Take 1 tablet by mouth every 6 hours if needed for severe pain (7 - 10).   Patient not taking: Reported on 3/19/2025    Paulie Porter MD  Active   hydrOXYzine pamoate (Vistaril) 25 mg capsule [id number] No Take 1 capsule (25 mg) by mouth if needed.   Patient not taking: Reported on 3/19/2025    Historical Provider, MD 2024 Active   potassium chloride 20 mEq/15 mL liquid [id number] No TAKE 15 ML (20 MEQ) BY MOUTH ONCE DAILY.   Patient not taking: Reported on 3/19/2025    Henry Briggs MD Unknown Active   potassium chloride CR (Klor-Con M20) 20 mEq ER tablet [id number]  Take 1 tablet (20 mEq) by mouth once daily.   Patient not taking: Reported on 3/19/2025    Henry Briggs MD  Active   triamterene-hydrochlorothiazid (Maxzide-25) 37.5-25 mg tablet [id number] Yes Take 1 tablet by mouth once daily. Henry Briggs MD  Active                    RX Allergies[3]    Review of Systems   Constitutional:  Negative for chills and fever.   HENT:  Negative for nosebleeds, sinus pressure and trouble swallowing.    Eyes:  Negative for discharge.   Respiratory:  Negative for shortness of breath and wheezing.    Cardiovascular:  Negative for chest pain.   Musculoskeletal:  Positive for arthralgias. Negative for joint swelling.   Skin:  Negative for pallor and wound.   Psychiatric/Behavioral:  The patient is not nervous/anxious.    All other systems reviewed and are negative.    Exam   On exam patient is alert, awake, no  acute distress.  Head is normocephalic, no JVD, no auditory wheezes.  She has some mild stiffness in the shoulders, good elbow and wrist motion.  She has fixed 30 degree PIP and 20 degree DIP flexion contracture.  Well-healed scar with some mild adhesion in the palm.  She has minimal DIP and PIP flexion pull-through actively.  Other digits move fairly well with no triggering.  Skin is warm and dry, without ulcerations, no other swelling or lymphadenopathy.  Good pulses and sensation in the upper extremity.    Assessment   Right small finger FDP tear    Plan   We discussed treatments with her today, her biggest issue is significant stiffness in the digit, possible revision tendon transfer surgery is a possibility but with her poor outcome from the first surgery, we would need to be very careful with this.  We would recommend possibly another opinion from Dr. Hernandez who is a specialist in more complex revision cases for his opinion.  We also discussed the possibility of a ray amputation in the future if she is very unhappy with the finger and it causes her more issues due to her lack of function.     I, Leigh Stewart PA-C, am acting as a scribe and attest that this documentation has been prepared under the direction and in the presence of Kashmir Ansari MD.  By signing below, I, Kashmir Ansari MD, personally performed the services described in this documentation. All medical record entries made by the scribe were at my direction and in my presence. I have reviewed the chart and agree that the record reflects my personal performance and is accurate and complete.                 [1]   Past Medical History:  Diagnosis Date    ADHD (attention deficit hyperactivity disorder)     Anxiety     Arthritis     Carpal tunnel syndrome, right upper limb 01/29/2019    Carpal tunnel syndrome of right wrist    Depression     Encounter for screening for diabetes mellitus 03/20/2017    Screening for diabetes mellitus (DM)    Encounter  for screening for diseases of the blood and blood-forming organs and certain disorders involving the immune mechanism 01/28/2020    Screening, anemia, deficiency, iron    Encounter for screening for malignant neoplasm of colon 09/29/2016    Colon cancer screening    Encounter for screening for other suspected endocrine disorder 02/28/2022    Screening for thyroid disorder    Generalized anxiety disorder 09/28/2020    Generalized anxiety disorder    Hypertension     Joint pain     Other specified postprocedural states 03/11/2022    History of Papanicolaou smear    Other specified postprocedural states 01/28/2020    History of Papanicolaou smear    Other specified postprocedural states 07/17/2020    History of Papanicolaou smear    Peripheral neuropathy     Personal history of other diseases of the circulatory system     History of hypertension    Personal history of other malignant neoplasm of skin     History of malignant neoplasm of skin    Psychophysiologic insomnia 04/11/2018    Chronic insomnia    Unilateral primary osteoarthritis, right knee     Osteoarthritis of right knee    Vitamin D deficiency, unspecified 09/30/2016    Vitamin D deficiency   [2]   Past Surgical History:  Procedure Laterality Date    FINGER SURGERY Left     NECK SURGERY  08/08/2016    Neck Surgery   [3]   Allergies  Allergen Reactions    Bupropion Headache      Yes

## 2025-05-16 ENCOUNTER — TELEPHONE (OUTPATIENT)
Dept: PAIN MEDICINE | Facility: CLINIC | Age: 60
End: 2025-05-16
Payer: COMMERCIAL

## 2025-06-11 ENCOUNTER — TELEPHONE (OUTPATIENT)
Dept: PAIN MEDICINE | Facility: CLINIC | Age: 60
End: 2025-06-11
Payer: COMMERCIAL

## 2025-08-07 ENCOUNTER — APPOINTMENT (OUTPATIENT)
Facility: CLINIC | Age: 60
End: 2025-08-07
Payer: COMMERCIAL

## 2025-08-07 PROBLEM — R50.9 FEVER: Status: ACTIVE | Noted: 2025-08-07

## 2025-08-07 PROBLEM — F90.0 ATTENTION DEFICIT HYPERACTIVITY DISORDER (ADHD), PREDOMINANTLY INATTENTIVE TYPE: Status: ACTIVE | Noted: 2025-08-07

## 2025-08-07 PROBLEM — Z86.79 HISTORY OF HYPERTENSION: Status: ACTIVE | Noted: 2025-08-07

## 2025-08-07 PROBLEM — I20.9 ANGINA PECTORIS: Status: ACTIVE | Noted: 2025-08-07

## 2025-08-07 PROBLEM — G89.29 CHRONIC PAIN: Status: ACTIVE | Noted: 2025-08-07

## 2025-08-07 PROBLEM — R20.0 NUMBNESS: Status: ACTIVE | Noted: 2025-08-07

## 2025-08-07 PROBLEM — R53.82 CHRONIC FATIGUE: Status: ACTIVE | Noted: 2025-08-07

## 2025-08-07 PROBLEM — Z85.828 HISTORY OF MALIGNANT NEOPLASM OF SKIN: Status: ACTIVE | Noted: 2025-08-07

## 2025-08-07 PROBLEM — E55.9 VITAMIN D DEFICIENCY: Status: ACTIVE | Noted: 2025-08-07

## 2025-08-07 PROBLEM — R79.89 ELEVATED FERRITIN: Status: ACTIVE | Noted: 2025-08-07

## 2025-08-07 PROBLEM — R19.7 DIARRHEA, UNSPECIFIED: Status: ACTIVE | Noted: 2025-08-07

## 2025-08-07 PROBLEM — R31.9 HEMATURIA: Status: ACTIVE | Noted: 2025-08-07

## 2025-08-07 PROBLEM — M17.11 OSTEOARTHRITIS OF RIGHT KNEE: Status: ACTIVE | Noted: 2025-08-07

## 2025-08-07 PROBLEM — M53.3 SACROILIAC JOINT PAIN: Status: ACTIVE | Noted: 2025-08-07

## 2025-08-07 PROBLEM — M70.62 GREATER TROCHANTERIC BURSITIS OF LEFT HIP: Status: ACTIVE | Noted: 2025-08-07

## 2025-08-07 PROBLEM — M54.16 BILATERAL LUMBAR RADICULOPATHY: Status: ACTIVE | Noted: 2025-08-07

## 2025-08-07 PROBLEM — Z14.8 HEMOCHROMATOSIS CARRIER: Status: ACTIVE | Noted: 2025-08-07

## 2025-08-07 PROBLEM — M25.531 RIGHT WRIST PAIN: Status: ACTIVE | Noted: 2025-08-07

## 2025-08-07 PROBLEM — F51.04 CHRONIC INSOMNIA: Status: ACTIVE | Noted: 2025-08-07

## 2025-08-07 PROBLEM — M62.830 MUSCLE SPASM OF BACK: Status: ACTIVE | Noted: 2025-08-07

## 2025-08-07 PROBLEM — E66.01 MORBID OBESITY (MULTI): Status: ACTIVE | Noted: 2025-08-07

## 2025-08-07 PROBLEM — J11.1 INFLUENZA: Status: ACTIVE | Noted: 2025-08-07

## 2025-08-11 ENCOUNTER — OFFICE VISIT (OUTPATIENT)
Dept: PAIN MEDICINE | Facility: CLINIC | Age: 60
End: 2025-08-11
Payer: COMMERCIAL

## 2025-08-11 VITALS
SYSTOLIC BLOOD PRESSURE: 120 MMHG | OXYGEN SATURATION: 97 % | WEIGHT: 157 LBS | HEIGHT: 61 IN | BODY MASS INDEX: 29.64 KG/M2 | RESPIRATION RATE: 16 BRPM | HEART RATE: 83 BPM | DIASTOLIC BLOOD PRESSURE: 68 MMHG

## 2025-08-11 DIAGNOSIS — M65.841 STENOSING TENOSYNOVITIS OF FINGER OF RIGHT HAND: ICD-10-CM

## 2025-08-11 DIAGNOSIS — S63.616A SPRAIN OF RIGHT LITTLE FINGER, UNSPECIFIED SITE OF DIGIT, INITIAL ENCOUNTER: Primary | ICD-10-CM

## 2025-08-11 DIAGNOSIS — S66.116A: ICD-10-CM

## 2025-08-11 PROCEDURE — 99203 OFFICE O/P NEW LOW 30 MIN: CPT

## 2025-08-11 PROCEDURE — 99204 OFFICE O/P NEW MOD 45 MIN: CPT | Performed by: ANESTHESIOLOGY

## 2025-08-11 RX ORDER — DULOXETIN HYDROCHLORIDE 30 MG/1
30 CAPSULE, DELAYED RELEASE ORAL DAILY
Qty: 30 CAPSULE | Refills: 1 | Status: SHIPPED | OUTPATIENT
Start: 2025-08-11 | End: 2025-09-10

## 2025-08-11 ASSESSMENT — ENCOUNTER SYMPTOMS
CARDIOVASCULAR NEGATIVE: 1
HEMATOLOGIC/LYMPHATIC NEGATIVE: 1
GASTROINTESTINAL NEGATIVE: 1
NEUROLOGICAL NEGATIVE: 1
ENDOCRINE NEGATIVE: 1
MUSCULOSKELETAL NEGATIVE: 1
EYES NEGATIVE: 1
RESPIRATORY NEGATIVE: 1
ALLERGIC/IMMUNOLOGIC NEGATIVE: 1
CONSTITUTIONAL NEGATIVE: 1
PSYCHIATRIC NEGATIVE: 1

## 2025-08-11 ASSESSMENT — PATIENT HEALTH QUESTIONNAIRE - PHQ9
1. LITTLE INTEREST OR PLEASURE IN DOING THINGS: NOT AT ALL
SUM OF ALL RESPONSES TO PHQ9 QUESTIONS 1 AND 2: 0
2. FEELING DOWN, DEPRESSED OR HOPELESS: NOT AT ALL

## 2025-08-11 ASSESSMENT — LIFESTYLE VARIABLES: TOTAL SCORE: 3

## 2025-08-11 ASSESSMENT — PAIN SCALES - GENERAL
PAINLEVEL_OUTOF10: 5 - MODERATE PAIN
PAINLEVEL_OUTOF10: 5

## 2025-08-11 ASSESSMENT — PAIN DESCRIPTION - DESCRIPTORS: DESCRIPTORS: TINGLING;THROBBING;STABBING

## 2025-08-11 ASSESSMENT — PAIN - FUNCTIONAL ASSESSMENT: PAIN_FUNCTIONAL_ASSESSMENT: 0-10

## 2025-08-21 ENCOUNTER — TELEPHONE (OUTPATIENT)
Dept: PRIMARY CARE | Facility: CLINIC | Age: 60
End: 2025-08-21
Payer: COMMERCIAL

## 2025-08-21 DIAGNOSIS — G62.9 NEUROPATHY: ICD-10-CM

## 2025-08-21 RX ORDER — GABAPENTIN 100 MG/1
CAPSULE ORAL
Qty: 180 CAPSULE | Refills: 1 | Status: SHIPPED | OUTPATIENT
Start: 2025-08-21

## 2025-08-26 ENCOUNTER — APPOINTMENT (OUTPATIENT)
Dept: PRIMARY CARE | Facility: CLINIC | Age: 60
End: 2025-08-26
Payer: COMMERCIAL

## 2025-08-26 ENCOUNTER — HOSPITAL ENCOUNTER (OUTPATIENT)
Dept: RADIOLOGY | Facility: CLINIC | Age: 60
Discharge: HOME | End: 2025-08-26
Payer: COMMERCIAL

## 2025-08-26 VITALS
OXYGEN SATURATION: 96 % | BODY MASS INDEX: 30.8 KG/M2 | HEART RATE: 92 BPM | DIASTOLIC BLOOD PRESSURE: 80 MMHG | WEIGHT: 163 LBS | SYSTOLIC BLOOD PRESSURE: 140 MMHG

## 2025-08-26 DIAGNOSIS — E66.01 MORBID OBESITY (MULTI): ICD-10-CM

## 2025-08-26 DIAGNOSIS — M54.50 RIGHT-SIDED LOW BACK PAIN WITHOUT SCIATICA, UNSPECIFIED CHRONICITY: ICD-10-CM

## 2025-08-26 DIAGNOSIS — I10 PRIMARY HYPERTENSION: ICD-10-CM

## 2025-08-26 DIAGNOSIS — M54.16 LUMBAR RADICULOPATHY, RIGHT: ICD-10-CM

## 2025-08-26 DIAGNOSIS — M54.50 RIGHT-SIDED LOW BACK PAIN WITHOUT SCIATICA, UNSPECIFIED CHRONICITY: Primary | ICD-10-CM

## 2025-08-26 DIAGNOSIS — M25.551 PAIN OF RIGHT HIP: ICD-10-CM

## 2025-08-26 DIAGNOSIS — F17.200 SMOKER: ICD-10-CM

## 2025-08-26 DIAGNOSIS — G89.29 CHRONIC RIGHT-SIDED LOW BACK PAIN WITHOUT SCIATICA: ICD-10-CM

## 2025-08-26 DIAGNOSIS — M54.50 CHRONIC RIGHT-SIDED LOW BACK PAIN WITHOUT SCIATICA: ICD-10-CM

## 2025-08-26 PROCEDURE — 99214 OFFICE O/P EST MOD 30 MIN: CPT | Performed by: INTERNAL MEDICINE

## 2025-08-26 PROCEDURE — 73502 X-RAY EXAM HIP UNI 2-3 VIEWS: CPT | Mod: RIGHT SIDE | Performed by: RADIOLOGY

## 2025-08-26 PROCEDURE — 3077F SYST BP >= 140 MM HG: CPT | Performed by: INTERNAL MEDICINE

## 2025-08-26 PROCEDURE — 72100 X-RAY EXAM L-S SPINE 2/3 VWS: CPT

## 2025-08-26 PROCEDURE — 73502 X-RAY EXAM HIP UNI 2-3 VIEWS: CPT | Mod: RT

## 2025-08-26 PROCEDURE — 72100 X-RAY EXAM L-S SPINE 2/3 VWS: CPT | Performed by: RADIOLOGY

## 2025-08-26 PROCEDURE — 3079F DIAST BP 80-89 MM HG: CPT | Performed by: INTERNAL MEDICINE

## 2025-08-26 RX ORDER — DM/P-EPHED/ACETAMINOPH/DOXYLAM 30-7.5/3
2 LIQUID (ML) ORAL AS NEEDED
Qty: 100 LOZENGE | Refills: 1 | Status: SHIPPED | OUTPATIENT
Start: 2025-08-26 | End: 2025-09-25

## 2025-08-26 RX ORDER — MELOXICAM 7.5 MG/1
7.5 TABLET ORAL DAILY
Qty: 30 TABLET | Refills: 1 | Status: SHIPPED | OUTPATIENT
Start: 2025-08-26 | End: 2025-09-25

## 2025-08-26 ASSESSMENT — PATIENT HEALTH QUESTIONNAIRE - PHQ9
2. FEELING DOWN, DEPRESSED OR HOPELESS: NOT AT ALL
2. FEELING DOWN, DEPRESSED OR HOPELESS: NOT AT ALL
1. LITTLE INTEREST OR PLEASURE IN DOING THINGS: NOT AT ALL
SUM OF ALL RESPONSES TO PHQ9 QUESTIONS 1 AND 2: 0
SUM OF ALL RESPONSES TO PHQ9 QUESTIONS 1 AND 2: 0
1. LITTLE INTEREST OR PLEASURE IN DOING THINGS: NOT AT ALL

## 2025-08-26 ASSESSMENT — ENCOUNTER SYMPTOMS: CONSTITUTIONAL NEGATIVE: 1

## 2025-08-26 ASSESSMENT — PAIN SCALES - GENERAL: PAINLEVEL_OUTOF10: 6

## 2025-09-02 ENCOUNTER — TELEPHONE (OUTPATIENT)
Dept: PRIMARY CARE | Facility: CLINIC | Age: 60
End: 2025-09-02
Payer: COMMERCIAL

## (undated) DEVICE — SUTURE, PDS II, 3-0, 27 IN, SH, CLEAR MONO

## (undated) DEVICE — CORD, BIPOLAR,  12 FT, DISPOSABLE, LF

## (undated) DEVICE — NEEDLE, HYPODERMIC, REGULAR WALL, REGULAR BEVEL, 18 G X 1.5 IN

## (undated) DEVICE — SUTURE, VICRYL, 4-0, 18 IN, PS2, UNDYED

## (undated) DEVICE — SUTURE, MONOCRYL, 4-0, 27 IN, PS-2, UNDYED

## (undated) DEVICE — SUTURE, ETHIBOND, XTRA, 30 IN, 4-0, RB-1, GREEN

## (undated) DEVICE — GLOVE, SURGICAL, PROTEXIS PI , 7.0, PF, LF

## (undated) DEVICE — BANDAGE, ELASTIC, ACE, SELF-CLOSURE, 3 IN X 5 YD, NONSTERILE

## (undated) DEVICE — SYRINGE, 20 CC, CONTROL, LUER LOCK, LF

## (undated) DEVICE — Device

## (undated) DEVICE — GLOVE, SURGICAL, PROTEXIS PI BLUE W/NEUTHERA, 7.5, PF, LF

## (undated) DEVICE — TOWEL, SURGICAL, NEURO, O/R, 16 X 26, BLUE, STERILE

## (undated) DEVICE — CUFF, TOURNIQUET, SINGLE BLADDER, SINGLE PORT, 18 IN

## (undated) DEVICE — BANDAGE, ELASTIC, MATRIX, SELF-CLOSURE, 3 IN X 5 YD, LF

## (undated) DEVICE — PADDING, UNDERCAST, WEBRIL, 3 IN X 4 YD, REG, NS

## (undated) DEVICE — PADDING, WEBRIL, UNDERCAST, STERILE, 3 IN

## (undated) DEVICE — SUTURE, CHROMIC GUT, 5/0, P-3, 18IN

## (undated) DEVICE — SUTURE, CHROMIC, 5-0, 18 IN, G3, DA, BROWN

## (undated) DEVICE — STOCKINETTE, DOUBLE PLY, 4 X 48 IN, STERILE

## (undated) DEVICE — SUTURE, ETHILON 4-0, P3, BLK MONO, 18

## (undated) DEVICE — DRESSING, GAUZE, SPONGE, 12 PLY, 4 X 4 IN, PLASTIC POUCH, STRL 10PK

## (undated) DEVICE — SUTURE, PROLENE, 5-0, 18 IN, P3, BLUE

## (undated) DEVICE — CUFF, TOURNIQUET, 18 X 4, DUAL PORT/SNGL BLADDER, DISP, LF